# Patient Record
Sex: MALE | Race: WHITE | NOT HISPANIC OR LATINO
[De-identification: names, ages, dates, MRNs, and addresses within clinical notes are randomized per-mention and may not be internally consistent; named-entity substitution may affect disease eponyms.]

---

## 2017-06-21 PROBLEM — Z00.00 ENCOUNTER FOR PREVENTIVE HEALTH EXAMINATION: Status: ACTIVE | Noted: 2017-06-21

## 2017-08-18 ENCOUNTER — APPOINTMENT (OUTPATIENT)
Dept: ORTHOPEDIC SURGERY | Facility: CLINIC | Age: 57
End: 2017-08-18
Payer: COMMERCIAL

## 2017-08-18 VITALS
HEART RATE: 56 BPM | WEIGHT: 270 LBS | BODY MASS INDEX: 32.88 KG/M2 | HEIGHT: 76 IN | DIASTOLIC BLOOD PRESSURE: 80 MMHG | SYSTOLIC BLOOD PRESSURE: 139 MMHG

## 2017-08-18 DIAGNOSIS — Z82.61 FAMILY HISTORY OF ARTHRITIS: ICD-10-CM

## 2017-08-18 DIAGNOSIS — M48.06 SPINAL STENOSIS, LUMBAR REGION: ICD-10-CM

## 2017-08-18 DIAGNOSIS — M51.26 OTHER INTERVERTEBRAL DISC DISPLACEMENT, LUMBAR REGION: ICD-10-CM

## 2017-08-18 DIAGNOSIS — Z87.891 PERSONAL HISTORY OF NICOTINE DEPENDENCE: ICD-10-CM

## 2017-08-18 DIAGNOSIS — Z78.9 OTHER SPECIFIED HEALTH STATUS: ICD-10-CM

## 2017-08-18 DIAGNOSIS — Z80.9 FAMILY HISTORY OF MALIGNANT NEOPLASM, UNSPECIFIED: ICD-10-CM

## 2017-08-18 DIAGNOSIS — Z85.828 PERSONAL HISTORY OF OTHER MALIGNANT NEOPLASM OF SKIN: ICD-10-CM

## 2017-08-18 PROCEDURE — 20610 DRAIN/INJ JOINT/BURSA W/O US: CPT | Mod: 50

## 2017-08-18 PROCEDURE — 73562 X-RAY EXAM OF KNEE 3: CPT | Mod: 50

## 2017-08-18 PROCEDURE — 73502 X-RAY EXAM HIP UNI 2-3 VIEWS: CPT | Mod: LT

## 2017-08-18 PROCEDURE — 99204 OFFICE O/P NEW MOD 45 MIN: CPT | Mod: 25

## 2017-08-18 RX ORDER — BECLOMETHASONE DIPROPIONATE 80 UG/1
80 AEROSOL, METERED RESPIRATORY (INHALATION)
Refills: 0 | Status: ACTIVE | COMMUNITY

## 2017-12-01 ENCOUNTER — APPOINTMENT (OUTPATIENT)
Dept: ORTHOPEDIC SURGERY | Facility: CLINIC | Age: 57
End: 2017-12-01
Payer: COMMERCIAL

## 2017-12-01 VITALS
BODY MASS INDEX: 32.88 KG/M2 | HEIGHT: 76 IN | HEART RATE: 62 BPM | SYSTOLIC BLOOD PRESSURE: 134 MMHG | WEIGHT: 270 LBS | DIASTOLIC BLOOD PRESSURE: 78 MMHG

## 2017-12-01 PROCEDURE — 99214 OFFICE O/P EST MOD 30 MIN: CPT | Mod: 25

## 2017-12-01 PROCEDURE — 20610 DRAIN/INJ JOINT/BURSA W/O US: CPT | Mod: 50

## 2017-12-01 RX ORDER — DICLOFENAC SODIUM AND MISOPROSTOL 75; 200 MG/1; UG/1
75-0.2 TABLET, DELAYED RELEASE ORAL DAILY
Qty: 30 | Refills: 2 | Status: ACTIVE | COMMUNITY
Start: 2017-12-01 | End: 1900-01-01

## 2017-12-15 ENCOUNTER — OTHER (OUTPATIENT)
Age: 57
End: 2017-12-15

## 2018-01-05 ENCOUNTER — OTHER (OUTPATIENT)
Age: 58
End: 2018-01-05

## 2018-01-23 ENCOUNTER — APPOINTMENT (OUTPATIENT)
Dept: ORTHOPEDIC SURGERY | Facility: CLINIC | Age: 58
End: 2018-01-23
Payer: COMMERCIAL

## 2018-01-23 VITALS
BODY MASS INDEX: 46.1 KG/M2 | HEART RATE: 73 BPM | DIASTOLIC BLOOD PRESSURE: 78 MMHG | HEIGHT: 64 IN | SYSTOLIC BLOOD PRESSURE: 153 MMHG | WEIGHT: 270 LBS

## 2018-01-23 PROCEDURE — 20610 DRAIN/INJ JOINT/BURSA W/O US: CPT | Mod: 50

## 2018-01-30 ENCOUNTER — APPOINTMENT (OUTPATIENT)
Dept: ORTHOPEDIC SURGERY | Facility: CLINIC | Age: 58
End: 2018-01-30
Payer: COMMERCIAL

## 2018-01-30 PROCEDURE — 20610 DRAIN/INJ JOINT/BURSA W/O US: CPT | Mod: 50

## 2018-02-06 ENCOUNTER — APPOINTMENT (OUTPATIENT)
Dept: ORTHOPEDIC SURGERY | Facility: CLINIC | Age: 58
End: 2018-02-06
Payer: COMMERCIAL

## 2018-02-06 VITALS
WEIGHT: 270 LBS | DIASTOLIC BLOOD PRESSURE: 84 MMHG | HEART RATE: 83 BPM | HEIGHT: 76 IN | SYSTOLIC BLOOD PRESSURE: 145 MMHG | BODY MASS INDEX: 32.88 KG/M2

## 2018-02-06 PROCEDURE — 20610 DRAIN/INJ JOINT/BURSA W/O US: CPT | Mod: 50

## 2018-02-06 RX ORDER — HYALURONATE SODIUM 10 MG/ML
20 VIAL (ML) INTRAARTICULAR
Qty: 12 | Refills: 0 | Status: DISCONTINUED | OUTPATIENT
Start: 2017-12-01 | End: 2018-02-06

## 2018-02-13 ENCOUNTER — APPOINTMENT (OUTPATIENT)
Dept: ORTHOPEDIC SURGERY | Facility: CLINIC | Age: 58
End: 2018-02-13
Payer: COMMERCIAL

## 2018-02-13 VITALS
DIASTOLIC BLOOD PRESSURE: 91 MMHG | HEART RATE: 73 BPM | HEIGHT: 76 IN | BODY MASS INDEX: 32.88 KG/M2 | SYSTOLIC BLOOD PRESSURE: 142 MMHG | WEIGHT: 270 LBS

## 2018-02-13 PROCEDURE — 20610 DRAIN/INJ JOINT/BURSA W/O US: CPT | Mod: 50

## 2018-02-13 RX ORDER — TRAMADOL HYDROCHLORIDE 50 MG/1
50 TABLET, COATED ORAL
Qty: 40 | Refills: 0 | Status: ACTIVE | COMMUNITY
Start: 2018-02-13 | End: 1900-01-01

## 2018-02-20 ENCOUNTER — APPOINTMENT (OUTPATIENT)
Dept: ORTHOPEDIC SURGERY | Facility: CLINIC | Age: 58
End: 2018-02-20
Payer: COMMERCIAL

## 2018-02-20 VITALS
WEIGHT: 270 LBS | SYSTOLIC BLOOD PRESSURE: 166 MMHG | DIASTOLIC BLOOD PRESSURE: 92 MMHG | HEIGHT: 76 IN | HEART RATE: 83 BPM | BODY MASS INDEX: 32.88 KG/M2

## 2018-02-20 PROCEDURE — 20610 DRAIN/INJ JOINT/BURSA W/O US: CPT | Mod: 50

## 2018-10-19 ENCOUNTER — APPOINTMENT (OUTPATIENT)
Dept: ORTHOPEDIC SURGERY | Facility: CLINIC | Age: 58
End: 2018-10-19
Payer: COMMERCIAL

## 2018-10-19 VITALS
HEART RATE: 58 BPM | DIASTOLIC BLOOD PRESSURE: 86 MMHG | TEMPERATURE: 98.3 F | HEIGHT: 76 IN | BODY MASS INDEX: 32.88 KG/M2 | SYSTOLIC BLOOD PRESSURE: 146 MMHG | WEIGHT: 270 LBS

## 2018-10-19 PROCEDURE — 99214 OFFICE O/P EST MOD 30 MIN: CPT | Mod: 25

## 2018-10-19 PROCEDURE — 20610 DRAIN/INJ JOINT/BURSA W/O US: CPT | Mod: LT

## 2021-08-30 ENCOUNTER — APPOINTMENT (OUTPATIENT)
Dept: ORTHOPEDIC SURGERY | Facility: CLINIC | Age: 61
End: 2021-08-30
Payer: COMMERCIAL

## 2021-08-30 VITALS
HEIGHT: 75 IN | BODY MASS INDEX: 32.33 KG/M2 | TEMPERATURE: 98.1 F | DIASTOLIC BLOOD PRESSURE: 88 MMHG | WEIGHT: 260 LBS | HEART RATE: 55 BPM | SYSTOLIC BLOOD PRESSURE: 144 MMHG

## 2021-08-30 DIAGNOSIS — M25.552 PAIN IN LEFT HIP: ICD-10-CM

## 2021-08-30 DIAGNOSIS — M16.12 UNILATERAL PRIMARY OSTEOARTHRITIS, LEFT HIP: ICD-10-CM

## 2021-08-30 DIAGNOSIS — M17.0 BILATERAL PRIMARY OSTEOARTHRITIS OF KNEE: ICD-10-CM

## 2021-08-30 PROCEDURE — 73502 X-RAY EXAM HIP UNI 2-3 VIEWS: CPT

## 2021-08-30 PROCEDURE — 99214 OFFICE O/P EST MOD 30 MIN: CPT

## 2021-08-30 RX ORDER — METFORMIN HYDROCHLORIDE 500 MG/1
500 TABLET, COATED ORAL
Qty: 180 | Refills: 0 | Status: ACTIVE | COMMUNITY
Start: 2021-04-20

## 2021-08-30 RX ORDER — AMOXICILLIN 500 MG/1
500 TABLET, FILM COATED ORAL
Qty: 30 | Refills: 0 | Status: ACTIVE | COMMUNITY
Start: 2021-04-06

## 2021-08-30 RX ORDER — IBUPROFEN 800 MG/1
800 TABLET, FILM COATED ORAL
Qty: 18 | Refills: 0 | Status: ACTIVE | COMMUNITY
Start: 2021-04-06

## 2021-08-30 RX ORDER — BECLOMETHASONE DIPROPIONATE HFA 80 UG/1
80 AEROSOL, METERED RESPIRATORY (INHALATION)
Qty: 11 | Refills: 0 | Status: ACTIVE | COMMUNITY
Start: 2021-07-05

## 2021-08-30 NOTE — HISTORY OF PRESENT ILLNESS
[Pain Location] : pain [Worsening] : worsening [___ yrs] : [unfilled] year(s) ago [7] : a current pain level of 7/10 [3] : a minimum pain level of 3/10 [8] : a maximum pain level of 8/10 [Hip Movement] : worsened by hip movement [Walking] : worsened by walking [Rest] : relieved by rest [de-identified] : 61 y/o M presents with b/l hip pain, left worse than right. He notes groin pain with walking. He can't exercise, including biking, because of the pain. He is taking Tramadol for 2 years for the hip pain. He underwent a CT of the pelvis. He is interested in left DELVIN. He also has a known hx of bone on bone osteoarthritis in the right knee.

## 2021-08-30 NOTE — PHYSICAL EXAM
[Antalgic] : antalgic [LE] : Sensory: Intact in bilateral lower extremities [ALL] : dorsalis pedis, posterior tibial, femoral, popliteal, and radial 2+ and symmetric bilaterally [Normal] : Alert and in no acute distress [Poor Appearance] : well-appearing [de-identified] : GENERAL APPEARANCE: Well nourished and hydrated, pleasant, alert, and oriented x 3. Appears their stated age. \par HEENT: Normocephalic, extraocular eye motion intact. Nasal septum midline. Oral cavity clear. External auditory canal clear. \par RESPIRATORY: Breath sounds clear and audible in all lobes. No wheezing, No accessory muscle use.\par CARDIOVASCULAR: No apparent abnormalities. No lower leg edema. No varicosities. Pedal pulses are palpable.\par NEUROLOGIC: Sensation is normal, no muscle weakness in the upper or lower extremities.\par DERMATOLOGIC: No apparent skin lesions, moist, warm, no rash.\par SPINE: Cervical spine appears normal and moves freely; thoracic spine appears normal and moves freely; lumbosacral spine appears normal and moves freely, normal, nontender.\par MUSCULOSKELETAL: Hands, wrists, and elbows are normal and move freely, shoulders are normal and move freely.  [de-identified] : left hip exam showed pain with SLR in the groin\par moderate reduction of hip ROM, pain with ROM \par mild antalgic gait. [de-identified] : 3V xray of the left hip done in the office today and reviewed by Dr. Pipe Gomez demonstrates bone on bone osteoarthritis \par \par CT scan of the pelvis performed at [facility] on 5/4/2021 showed the following:\par 1. No evidence of sacroiliitis.\par 2. Osteoarthritis severe in the left hip, mild at the right hip, mild-to-moderate at the pubic symphysis.\par 3. Trace left hip joint effusion.\par 4. Small focal hernia containing fat.\par 5. Incompletely descended right testis.\par 6. Mild enlargement of the prostate.\par 7. Lumbar spondylosis, postoperative changes as noted. Severe bilateral femoral stenosis at L4-L5 and L5-S1.

## 2021-08-30 NOTE — END OF VISIT
[FreeTextEntry3] : I, Rey Jay, acted solely as a scribe for Dr. Pipe Gomez on this date 08/30/2021.

## 2021-08-30 NOTE — DISCUSSION/SUMMARY
[Medication Risks Reviewed] : Medication risks reviewed [Surgical risks reviewed] : Surgical risks reviewed [de-identified] : 59 y/o M with bone on bone left hip osteoarthritis. Conservative therapy and surgical options discussed in detail with the patient. The patient is a candidate for a left DELVIN. He is experiencing worsening pain which is inhibiting his ADLs including but not limited to walking, standing, biking, and sitting. He is interested in pursuing surgery. We discussed pre-op, surgery, and post-op in detail. The patient is aware and comprehends the risk of infection, dislocation, and falling post-op. He scheduled the left DELVIN today. All of his questions were answered. \par \par The patient is a 60 year individual with end stage arthritis of their left hip joint. Based upon the patient's continued symptoms and failure to respond to conservative treatment I have recommended a left total hip arthroplasty for this patient. A long discussion took place with the patient describing what a total joint replacement is and what the procedure would entail. A total hip arthroplasty model, similar to the implant that will be used during the operation, was utilized to demonstrate and to discuss the various bearing surfaces of the implants. The hospitalization and post-operative care and rehabilitation were also discussed. The use of perioperative antibiotics and DVT prophylaxis were discussed. The risk, benefits and alternatives to a surgical intervention were discussed at length with the patient. The patient was also advised of risks related to the medical comorbidities, elevated body mass index (BMI), and smoking where applicable. We discussed how to reduce modifiable risk factors and encouraged smoking cessation were applicable.. A lengthy discussion took place to review the most common complications including but not limited to: deep vein thrombosis, pulmonary embolus, heart attack, stroke, infection, wound breakdown, numbness, damage to nerves, tendon, muscles, arteries or other blood vessels, death and other possible complications from anesthesia. The patient was told that we will take steps to minimize these risks by using sterile technique, antibiotics and DVT prophylaxis when appropriate and follow the patient postoperatively in the office setting to monitor progress. The possibility of recurrent pain, no improvement in pain and actual worsening of pain were also discussed with the patient.\par The discharge plan of care focused on the patient going home following surgery. The patient was encouraged to make the necessary arrangements to have someone stay with them when they are discharged home. Following discharge, a home care nurse will visit the patient. The home care nurse will open your home care case and request home physical therapy services. Home physical therapy will commence following discharge provided it is appropriate and covered by the health insurance benefit plan. \par The benefits of surgery were discussed with the patient including the potential for improving his current clinical condition through operative intervention. Alternatives to surgical intervention including continued conservative management were also discussed in detail. All questions were answered to the satisfaction of the patient. The treatment plan of care, as well as a model of a total hip arthroplasty equivalent to the one that will be used for their total joint replacement, was shared with the patient. The patient agreed to the plan of care as well as the use of implants in their total joint replacement.

## 2021-12-22 ENCOUNTER — OUTPATIENT (OUTPATIENT)
Dept: OUTPATIENT SERVICES | Facility: HOSPITAL | Age: 61
LOS: 1 days | End: 2021-12-22
Payer: COMMERCIAL

## 2021-12-22 VITALS
HEIGHT: 75 IN | OXYGEN SATURATION: 98 % | WEIGHT: 268.52 LBS | RESPIRATION RATE: 19 BRPM | HEART RATE: 58 BPM | DIASTOLIC BLOOD PRESSURE: 70 MMHG | TEMPERATURE: 98 F | SYSTOLIC BLOOD PRESSURE: 120 MMHG

## 2021-12-22 DIAGNOSIS — Z98.890 OTHER SPECIFIED POSTPROCEDURAL STATES: Chronic | ICD-10-CM

## 2021-12-22 DIAGNOSIS — Z29.9 ENCOUNTER FOR PROPHYLACTIC MEASURES, UNSPECIFIED: ICD-10-CM

## 2021-12-22 DIAGNOSIS — M16.12 UNILATERAL PRIMARY OSTEOARTHRITIS, LEFT HIP: ICD-10-CM

## 2021-12-22 DIAGNOSIS — Z01.818 ENCOUNTER FOR OTHER PREPROCEDURAL EXAMINATION: ICD-10-CM

## 2021-12-22 DIAGNOSIS — Z13.89 ENCOUNTER FOR SCREENING FOR OTHER DISORDER: ICD-10-CM

## 2021-12-22 LAB
A1C WITH ESTIMATED AVERAGE GLUCOSE RESULT: 5.8 % — HIGH (ref 4–5.6)
ANION GAP SERPL CALC-SCNC: 12 MMOL/L — SIGNIFICANT CHANGE UP (ref 5–17)
APTT BLD: 33.1 SEC — SIGNIFICANT CHANGE UP (ref 27.5–35.5)
BASOPHILS # BLD AUTO: 0.04 K/UL — SIGNIFICANT CHANGE UP (ref 0–0.2)
BASOPHILS NFR BLD AUTO: 0.8 % — SIGNIFICANT CHANGE UP (ref 0–2)
BLD GP AB SCN SERPL QL: SIGNIFICANT CHANGE UP
BUN SERPL-MCNC: 13.4 MG/DL — SIGNIFICANT CHANGE UP (ref 8–20)
CALCIUM SERPL-MCNC: 9 MG/DL — SIGNIFICANT CHANGE UP (ref 8.6–10.2)
CHLORIDE SERPL-SCNC: 103 MMOL/L — SIGNIFICANT CHANGE UP (ref 98–107)
CO2 SERPL-SCNC: 25 MMOL/L — SIGNIFICANT CHANGE UP (ref 22–29)
CREAT SERPL-MCNC: 0.84 MG/DL — SIGNIFICANT CHANGE UP (ref 0.5–1.3)
EOSINOPHIL # BLD AUTO: 0.11 K/UL — SIGNIFICANT CHANGE UP (ref 0–0.5)
EOSINOPHIL NFR BLD AUTO: 2.1 % — SIGNIFICANT CHANGE UP (ref 0–6)
ESTIMATED AVERAGE GLUCOSE: 120 MG/DL — HIGH (ref 68–114)
GLUCOSE SERPL-MCNC: 117 MG/DL — HIGH (ref 70–99)
HCT VFR BLD CALC: 43.2 % — SIGNIFICANT CHANGE UP (ref 39–50)
HGB BLD-MCNC: 14.5 G/DL — SIGNIFICANT CHANGE UP (ref 13–17)
IMM GRANULOCYTES NFR BLD AUTO: 0.2 % — SIGNIFICANT CHANGE UP (ref 0–1.5)
INR BLD: 1.05 RATIO — SIGNIFICANT CHANGE UP (ref 0.88–1.16)
LYMPHOCYTES # BLD AUTO: 0.66 K/UL — LOW (ref 1–3.3)
LYMPHOCYTES # BLD AUTO: 12.5 % — LOW (ref 13–44)
MCHC RBC-ENTMCNC: 32.2 PG — SIGNIFICANT CHANGE UP (ref 27–34)
MCHC RBC-ENTMCNC: 33.6 GM/DL — SIGNIFICANT CHANGE UP (ref 32–36)
MCV RBC AUTO: 96 FL — SIGNIFICANT CHANGE UP (ref 80–100)
MONOCYTES # BLD AUTO: 0.56 K/UL — SIGNIFICANT CHANGE UP (ref 0–0.9)
MONOCYTES NFR BLD AUTO: 10.6 % — SIGNIFICANT CHANGE UP (ref 2–14)
NEUTROPHILS # BLD AUTO: 3.91 K/UL — SIGNIFICANT CHANGE UP (ref 1.8–7.4)
NEUTROPHILS NFR BLD AUTO: 73.8 % — SIGNIFICANT CHANGE UP (ref 43–77)
PLATELET # BLD AUTO: 234 K/UL — SIGNIFICANT CHANGE UP (ref 150–400)
POTASSIUM SERPL-MCNC: 4.7 MMOL/L — SIGNIFICANT CHANGE UP (ref 3.5–5.3)
POTASSIUM SERPL-SCNC: 4.7 MMOL/L — SIGNIFICANT CHANGE UP (ref 3.5–5.3)
PROTHROM AB SERPL-ACNC: 12.2 SEC — SIGNIFICANT CHANGE UP (ref 10.6–13.6)
RBC # BLD: 4.5 M/UL — SIGNIFICANT CHANGE UP (ref 4.2–5.8)
RBC # FLD: 11.9 % — SIGNIFICANT CHANGE UP (ref 10.3–14.5)
SODIUM SERPL-SCNC: 139 MMOL/L — SIGNIFICANT CHANGE UP (ref 135–145)
WBC # BLD: 5.29 K/UL — SIGNIFICANT CHANGE UP (ref 3.8–10.5)
WBC # FLD AUTO: 5.29 K/UL — SIGNIFICANT CHANGE UP (ref 3.8–10.5)

## 2021-12-22 PROCEDURE — 93005 ELECTROCARDIOGRAM TRACING: CPT

## 2021-12-22 PROCEDURE — 93010 ELECTROCARDIOGRAM REPORT: CPT

## 2021-12-22 PROCEDURE — G0463: CPT

## 2021-12-22 RX ORDER — SODIUM CHLORIDE 9 MG/ML
3 INJECTION INTRAMUSCULAR; INTRAVENOUS; SUBCUTANEOUS EVERY 8 HOURS
Refills: 0 | Status: DISCONTINUED | OUTPATIENT
Start: 2022-01-11 | End: 2022-01-11

## 2021-12-22 NOTE — PATIENT PROFILE ADULT - NSPROHMSYMPCOND_GEN_A_NUR
pre-op instructions reviewed NP to complete. MRSA/MSSA swabbed. Joint class info & book given , Covid testing required 72 hours prior to procedure

## 2021-12-22 NOTE — H&P PST ADULT - MUSCULOSKELETAL
negative Left hip/no joint swelling/no joint erythema/no joint warmth/decreased ROM/decreased ROM due to pain detailed exam

## 2021-12-22 NOTE — H&P PST ADULT - ASSESSMENT
60 y/o male scheduled for left hip total joint replacement with Dr. Jason Betancur 2022.   Medical clearance  Cardiac clearance    OPIOID RISK TOOL    BERNY EACH BOX THAT APPLIES AND ADD TOTALS AT THE END    FAMILY HISTORY OF SUBSTANCE ABUSE                 FEMALE         MALE                                                Alcohol                             [  ]1 pt          [  ]3pts                                               Illegal Durgs                     [  ]2 pts        [  ]3pts                                               Rx Drugs                           [  ]4 pts        [  ]4 pts    PERSONAL HISTORY OF SUBSTANCE ABUSE                                                                                          Alcohol                             [  ]3 pts       [  ]3 pts                                               Illegal Drugs                     [  ]4 pts        [  ]4 pts                                               Rx Drugs                           [  ]5 pts        [  ]5 pts    AGE BETWEEN 16-45 YEARS                                      [  ]1 pt         [  ]1 pt    HISTORY OF PREADOLESCENT   SEXUAL ABUSE                                                             [  ]3 pts        [  ]0pts    PSYCHOLOGICAL DISEASE                     ADD, OCD, Bipolar, Schizophrenia        [  ]2 pts         [  ]2 pts                      Depression                                               [  ]1 pt           [  ]1 pt           SCORING TOTAL   (add numbers and type here)              (0)                                     A score of 3 or lower indicated LOW risk for future opioid abuse  A score of 4 to 7 indicated moderate risk for future opioid abuse  A score of 8 or higher indicates a high risk for opioid abuse    CAPRINI SCORE [CLOT]    AGE RELATED RISK FACTORS                                                       MOBILITY RELATED FACTORS  [ ] Age 41-60 years                                            (1 Point)                  [ ] Bed rest                                                        (1 Point)  [x ] Age: 61-74 years                                           (2 Points)                 [ ] Plaster cast                                                   (2 Points)  [ ] Age= 75 years                                              (3 Points)                 [ ] Bed bound for more than 72 hours                 (2 Points)    DISEASE RELATED RISK FACTORS                                               GENDER SPECIFIC FACTORS  [ ] Edema in the lower extremities                       (1 Point)                  [ ] Pregnancy                                                     (1 Point)  [ ] Varicose veins                                               (1 Point)                  [ ] Post-partum < 6 weeks                                   (1 Point)             [ x] BMI > 25 Kg/m2                                            (1 Point)                  [ ] Hormonal therapy  or oral contraception          (1 Point)                 [ ] Sepsis (in the previous month)                        (1 Point)                  [ ] History of pregnancy complications                 (1 point)  [ ] Pneumonia or serious lung disease                                               [ ] Unexplained or recurrent                     (1 Point)           (in the previous month)                               (1 Point)  [ ] Abnormal pulmonary function test                     (1 Point)                 SURGERY RELATED RISK FACTORS  [ ] Acute myocardial infarction                              (1 Point)                 [ ]  Section                                             (1 Point)  [ ] Congestive heart failure (in the previous month)  (1 Point)               [ ] Minor surgery                                                  (1 Point)   [ ] Inflammatory bowel disease                             (1 Point)                 [ ] Arthroscopic surgery                                        (2 Points)  [ ] Central venous access                                      (2 Points)                [ x] General surgery lasting more than 45 minutes   (2 Points)       [ ] Stroke (in the previous month)                          (5 Points)               [ ] Elective arthroplasty                                         (5 Points)                                                                                                                                               HEMATOLOGY RELATED FACTORS                                                 TRAUMA RELATED RISK FACTORS  [ ] Prior episodes of VTE                                     (3 Points)                [ ] Fracture of the hip, pelvis, or leg                       (5 Points)  [ ] Positive family history for VTE                         (3 Points)                 [ ] Acute spinal cord injury (in the previous month)  (5 Points)  [ ] Prothrombin 57180 A                                     (3 Points)                 [ ] Paralysis  (less than 1 month)                             (5 Points)  [ ] Factor V Leiden                                             (3 Points)                  [ ] Multiple Trauma within 1 month                        (5 Points)  [ ] Lupus anticoagulants                                     (3 Points)                                                           [ ] Anticardiolipin antibodies                               (3 Points)                                                       [ ] High homocysteine in the blood                      (3 Points)                                             [ ] Other congenital or acquired thrombophilia      (3 Points)                                                [ ] Heparin induced thrombocytopenia                  (3 Points)                                          Total Score [     5     ]    Caprini Score 0 - 2:  Low Risk, No VTE Prophylaxis required for most patients, encourage ambulation  Caprini Score 3 - 6:  At Risk, pharmacologic VTE prophylaxis is indicated for most patients (in the absence of a contraindication)  Caprini Score Greater than or = 7:  High Risk, pharmacologic VTE prophylaxis is indicated for most patients (in the absence of a contraindication)

## 2021-12-22 NOTE — H&P PST ADULT - HISTORY OF PRESENT ILLNESS
62 y/o male with PMH presents to PST with c/o of left hip pain. Pain for about 4 years- pain is becoming worse - radiate to left groin. Pain is aggreavtae dby ambulation. Better with sitting down. Tramadol for pain with somed effect. Scheduled for  60 y/o male with PMH of asthma and basal cell carcinoma presents to PST with c/o of left hip pain. Reports hx of left hip pain for about 4 years but is becoming progressively worse. Pain radiates to left groin. Aggravated by ambulation and alleviated by sitting. Takes tramadol for pain with some effect. Scheduled for left hip total joint replacement with Dr. Jason Betancur 1/11/2022.

## 2021-12-22 NOTE — H&P PST ADULT - NSICDXPASTSURGICALHX_GEN_ALL_CORE_FT
PAST SURGICAL HISTORY:  H/O arthroscopy of knee Bilateral- 1976,2002,2012    History of back surgery 2014

## 2021-12-22 NOTE — PATIENT PROFILE ADULT - FALL HARM RISK - UNIVERSAL INTERVENTIONS
Bed in lowest position, wheels locked, appropriate side rails in place/Call bell, personal items and telephone in reach/Instruct patient to call for assistance before getting out of bed or chair/Non-slip footwear when patient is out of bed/[place] to call system/Physically safe environment - no spills, clutter or unnecessary equipment/Purposeful Proactive Rounding/Room/bathroom lighting operational, light cord in reach

## 2021-12-22 NOTE — H&P PST ADULT - PROBLEM SELECTOR PLAN 3
cheduled for left hip total joint replacement with Dr. Jason Betancur 1/11/2022.   Medical clearance  Cardiac clearance

## 2021-12-22 NOTE — H&P PST ADULT - PROBLEM SELECTOR PLAN 2
Caprini score 5- Moderate score    Primary team assess/recommend pharmacological/mechanical measures for VTE prophylaxis

## 2021-12-23 LAB
MRSA PCR RESULT.: SIGNIFICANT CHANGE UP
S AUREUS DNA NOSE QL NAA+PROBE: SIGNIFICANT CHANGE UP

## 2022-01-07 RX ORDER — CEFAZOLIN SODIUM 1 G
3000 VIAL (EA) INJECTION ONCE
Refills: 0 | Status: DISCONTINUED | OUTPATIENT
Start: 2022-01-11 | End: 2022-01-11

## 2022-01-07 RX ORDER — TRANEXAMIC ACID 100 MG/ML
1000 INJECTION, SOLUTION INTRAVENOUS ONCE
Refills: 0 | Status: DISCONTINUED | OUTPATIENT
Start: 2022-01-11 | End: 2022-01-11

## 2022-01-10 ENCOUNTER — FORM ENCOUNTER (OUTPATIENT)
Age: 62
End: 2022-01-10

## 2022-01-10 ENCOUNTER — TRANSCRIPTION ENCOUNTER (OUTPATIENT)
Age: 62
End: 2022-01-10

## 2022-01-11 ENCOUNTER — APPOINTMENT (OUTPATIENT)
Dept: ORTHOPEDIC SURGERY | Facility: HOSPITAL | Age: 62
End: 2022-01-11

## 2022-01-11 ENCOUNTER — INPATIENT (INPATIENT)
Facility: HOSPITAL | Age: 62
LOS: 0 days | Discharge: HOME | DRG: 470 | End: 2022-01-12
Attending: ORTHOPAEDIC SURGERY | Admitting: ORTHOPAEDIC SURGERY
Payer: COMMERCIAL

## 2022-01-11 ENCOUNTER — TRANSCRIPTION ENCOUNTER (OUTPATIENT)
Age: 62
End: 2022-01-11

## 2022-01-11 VITALS
TEMPERATURE: 98 F | DIASTOLIC BLOOD PRESSURE: 81 MMHG | WEIGHT: 259.93 LBS | HEIGHT: 75 IN | RESPIRATION RATE: 16 BRPM | SYSTOLIC BLOOD PRESSURE: 156 MMHG | OXYGEN SATURATION: 100 % | HEART RATE: 58 BPM

## 2022-01-11 DIAGNOSIS — Z98.890 OTHER SPECIFIED POSTPROCEDURAL STATES: Chronic | ICD-10-CM

## 2022-01-11 DIAGNOSIS — M16.12 UNILATERAL PRIMARY OSTEOARTHRITIS, LEFT HIP: ICD-10-CM

## 2022-01-11 LAB
BLD GP AB SCN SERPL QL: SIGNIFICANT CHANGE UP
GLUCOSE BLDC GLUCOMTR-MCNC: 112 MG/DL — HIGH (ref 70–99)
GLUCOSE BLDC GLUCOMTR-MCNC: 113 MG/DL — HIGH (ref 70–99)

## 2022-01-11 PROCEDURE — 27130 TOTAL HIP ARTHROPLASTY: CPT | Mod: LT

## 2022-01-11 PROCEDURE — 27130 TOTAL HIP ARTHROPLASTY: CPT | Mod: AS,LT

## 2022-01-11 PROCEDURE — 99222 1ST HOSP IP/OBS MODERATE 55: CPT

## 2022-01-11 PROCEDURE — 73501 X-RAY EXAM HIP UNI 1 VIEW: CPT | Mod: 26,LT

## 2022-01-11 DEVICE — IMPLANTABLE DEVICE: Type: IMPLANTABLE DEVICE | Status: FUNCTIONAL

## 2022-01-11 DEVICE — SCREW ACET SELF TAP 6.5X35MM: Type: IMPLANTABLE DEVICE | Status: FUNCTIONAL

## 2022-01-11 DEVICE — HEAD FEM 40MM -3.5MMBIOLOX DELTA CERAMIC: Type: IMPLANTABLE DEVICE | Status: FUNCTIONAL

## 2022-01-11 RX ORDER — MAGNESIUM HYDROXIDE 400 MG/1
30 TABLET, CHEWABLE ORAL DAILY
Refills: 0 | Status: DISCONTINUED | OUTPATIENT
Start: 2022-01-11 | End: 2022-01-12

## 2022-01-11 RX ORDER — SENNA PLUS 8.6 MG/1
2 TABLET ORAL AT BEDTIME
Refills: 0 | Status: DISCONTINUED | OUTPATIENT
Start: 2022-01-11 | End: 2022-01-12

## 2022-01-11 RX ORDER — ASPIRIN/CALCIUM CARB/MAGNESIUM 324 MG
325 TABLET ORAL
Refills: 0 | Status: DISCONTINUED | OUTPATIENT
Start: 2022-01-11 | End: 2022-01-12

## 2022-01-11 RX ORDER — SODIUM CHLORIDE 9 MG/ML
1000 INJECTION, SOLUTION INTRAVENOUS
Refills: 0 | Status: DISCONTINUED | OUTPATIENT
Start: 2022-01-11 | End: 2022-01-11

## 2022-01-11 RX ORDER — KETOROLAC TROMETHAMINE 30 MG/ML
15 SYRINGE (ML) INJECTION EVERY 6 HOURS
Refills: 0 | Status: DISCONTINUED | OUTPATIENT
Start: 2022-01-11 | End: 2022-01-12

## 2022-01-11 RX ORDER — HYDROMORPHONE HYDROCHLORIDE 2 MG/ML
4 INJECTION INTRAMUSCULAR; INTRAVENOUS; SUBCUTANEOUS
Refills: 0 | Status: DISCONTINUED | OUTPATIENT
Start: 2022-01-11 | End: 2022-01-12

## 2022-01-11 RX ORDER — ACETAMINOPHEN 500 MG
1000 TABLET ORAL
Refills: 0 | Status: COMPLETED | OUTPATIENT
Start: 2022-01-11 | End: 2022-01-11

## 2022-01-11 RX ORDER — ACETAMINOPHEN 500 MG
975 TABLET ORAL ONCE
Refills: 0 | Status: COMPLETED | OUTPATIENT
Start: 2022-01-11 | End: 2022-01-11

## 2022-01-11 RX ORDER — CELECOXIB 200 MG/1
400 CAPSULE ORAL ONCE
Refills: 0 | Status: COMPLETED | OUTPATIENT
Start: 2022-01-11 | End: 2022-01-11

## 2022-01-11 RX ORDER — CEFAZOLIN SODIUM 1 G
2000 VIAL (EA) INJECTION
Refills: 0 | Status: COMPLETED | OUTPATIENT
Start: 2022-01-11 | End: 2022-01-12

## 2022-01-11 RX ORDER — OXYCODONE HYDROCHLORIDE 5 MG/1
5 TABLET ORAL
Refills: 0 | Status: DISCONTINUED | OUTPATIENT
Start: 2022-01-11 | End: 2022-01-12

## 2022-01-11 RX ORDER — SODIUM CHLORIDE 9 MG/ML
1000 INJECTION INTRAMUSCULAR; INTRAVENOUS; SUBCUTANEOUS
Refills: 0 | Status: DISCONTINUED | OUTPATIENT
Start: 2022-01-11 | End: 2022-01-12

## 2022-01-11 RX ORDER — APREPITANT 80 MG/1
40 CAPSULE ORAL ONCE
Refills: 0 | Status: COMPLETED | OUTPATIENT
Start: 2022-01-11 | End: 2022-01-11

## 2022-01-11 RX ORDER — ONDANSETRON 8 MG/1
4 TABLET, FILM COATED ORAL EVERY 6 HOURS
Refills: 0 | Status: DISCONTINUED | OUTPATIENT
Start: 2022-01-11 | End: 2022-01-12

## 2022-01-11 RX ORDER — HYDROMORPHONE HYDROCHLORIDE 2 MG/ML
0.5 INJECTION INTRAMUSCULAR; INTRAVENOUS; SUBCUTANEOUS EVERY 4 HOURS
Refills: 0 | Status: DISCONTINUED | OUTPATIENT
Start: 2022-01-11 | End: 2022-01-12

## 2022-01-11 RX ORDER — ALBUTEROL 90 UG/1
2 AEROSOL, METERED ORAL EVERY 6 HOURS
Refills: 0 | Status: DISCONTINUED | OUTPATIENT
Start: 2022-01-11 | End: 2022-01-12

## 2022-01-11 RX ORDER — POLYETHYLENE GLYCOL 3350 17 G/17G
17 POWDER, FOR SOLUTION ORAL AT BEDTIME
Refills: 0 | Status: DISCONTINUED | OUTPATIENT
Start: 2022-01-11 | End: 2022-01-12

## 2022-01-11 RX ORDER — CELECOXIB 200 MG/1
200 CAPSULE ORAL EVERY 12 HOURS
Refills: 0 | Status: DISCONTINUED | OUTPATIENT
Start: 2022-01-12 | End: 2022-01-12

## 2022-01-11 RX ORDER — SODIUM CHLORIDE 9 MG/ML
500 INJECTION INTRAMUSCULAR; INTRAVENOUS; SUBCUTANEOUS ONCE
Refills: 0 | Status: COMPLETED | OUTPATIENT
Start: 2022-01-11 | End: 2022-01-11

## 2022-01-11 RX ORDER — ACETAMINOPHEN 500 MG
975 TABLET ORAL EVERY 8 HOURS
Refills: 0 | Status: DISCONTINUED | OUTPATIENT
Start: 2022-01-12 | End: 2022-01-12

## 2022-01-11 RX ORDER — OXYCODONE HYDROCHLORIDE 5 MG/1
10 TABLET ORAL
Refills: 0 | Status: DISCONTINUED | OUTPATIENT
Start: 2022-01-11 | End: 2022-01-12

## 2022-01-11 RX ORDER — FENTANYL CITRATE 50 UG/ML
25 INJECTION INTRAVENOUS
Refills: 0 | Status: DISCONTINUED | OUTPATIENT
Start: 2022-01-11 | End: 2022-01-11

## 2022-01-11 RX ORDER — PANTOPRAZOLE SODIUM 20 MG/1
40 TABLET, DELAYED RELEASE ORAL
Refills: 0 | Status: DISCONTINUED | OUTPATIENT
Start: 2022-01-11 | End: 2022-01-12

## 2022-01-11 RX ADMIN — SODIUM CHLORIDE 500 MILLILITER(S): 9 INJECTION INTRAMUSCULAR; INTRAVENOUS; SUBCUTANEOUS at 17:07

## 2022-01-11 RX ADMIN — Medication 975 MILLIGRAM(S): at 11:28

## 2022-01-11 RX ADMIN — CELECOXIB 400 MILLIGRAM(S): 200 CAPSULE ORAL at 11:28

## 2022-01-11 RX ADMIN — SODIUM CHLORIDE 75 MILLILITER(S): 9 INJECTION, SOLUTION INTRAVENOUS at 17:07

## 2022-01-11 RX ADMIN — Medication 100 MILLIGRAM(S): at 21:45

## 2022-01-11 RX ADMIN — APREPITANT 40 MILLIGRAM(S): 80 CAPSULE ORAL at 11:28

## 2022-01-11 RX ADMIN — Medication 400 MILLIGRAM(S): at 18:47

## 2022-01-11 RX ADMIN — SENNA PLUS 2 TABLET(S): 8.6 TABLET ORAL at 21:44

## 2022-01-11 RX ADMIN — HYDROMORPHONE HYDROCHLORIDE 4 MILLIGRAM(S): 2 INJECTION INTRAMUSCULAR; INTRAVENOUS; SUBCUTANEOUS at 22:00

## 2022-01-11 RX ADMIN — HYDROMORPHONE HYDROCHLORIDE 4 MILLIGRAM(S): 2 INJECTION INTRAMUSCULAR; INTRAVENOUS; SUBCUTANEOUS at 21:45

## 2022-01-11 NOTE — DISCHARGE NOTE PROVIDER - NSDCFUADDINST_GEN_ALL_CORE_FT
The patient will be seen in the office between 2-3 weeks for wound check.   **Your first post-operative visit has been scheduled prior to your admission. PLEASE CONTACT OFFICE TO CONFIRM THE APPOINTMENT DATE.   **  The silver based dressing is to be removed 7 days from the date of surgery.   ** CONTACT THE OFFICE IF THE FOLLOWING DEVELOP:  - the dressing becomes soiled or saturated  - you develop a fever greater that 101F  - the wound becomes red or you develop blistering around the wound  * Patient may shower after post-op day #3.   * The patient will continue home PT consistent with posterior total hip replacement protocol and will continue to practice posterior total hip precautions for a minimum of 6 week. Transition to outpatient PT will occur at the time of the first office visit.   * The patient is FULL weight bearing.  * The patient will continue ASPIRIN for 6 weeks after surgery for blood clot prevention.  *** While on aspirin, the patient will take daily omeprazole or other similar medication to protect the stomach from irritation.   * The patient will take OXYCODONE AND TYLENOL for pain control and adjust according to prescription and patient needs. Contact the office if pain increases while taking prescribed pain medications or related concerns develop.  * Celebrex will be taken twice daily for 3 weeks for pain control and prevention of excessive bone growth. Additional prescription may be requested at your office follow-up visit.  * The patient will take Senna S while taking oxycodone to prevent narcotic associated constipation.  Additionally, increase water intake (drink at least 8 glasses of water daily) and try adding fiber to the diet by eating fruits, vegetables and foods that are rich in grains. If constipation is experienced, contact the medical/primary care provider to discuss further treatment options.  * To avoid injury at home:  - continue use of rolling walker until cleared by physical therapist  - have family or friend remove all throw rug or objects in hallways that may present a trip hazard.  - if you experience any dizziness or medical concerns, call your medical doctor or  911.  * The implant may activate metal detection devices.  The patient will be seen in the office between 2-3 weeks for wound check.   **Your first post-operative visit has been scheduled prior to your admission. PLEASE CONTACT OFFICE TO CONFIRM THE APPOINTMENT DATE.   **  The silver based dressing is to be removed 7 days from the date of surgery (1/18).   ** CONTACT THE OFFICE IF THE FOLLOWING DEVELOP:  - the dressing becomes soiled or saturated  - you develop a fever greater that 101F  - the wound becomes red or you develop blistering around the wound  * Patient may shower after post-op day #3.   * The patient will continue home PT consistent with posterior total hip replacement protocol and will continue to practice posterior total hip precautions for a minimum of 6 week. Transition to outpatient PT will occur at the time of the first office visit.   * The patient is FULL weight bearing.  * The patient will continue ASPIRIN for 6 weeks after surgery for blood clot prevention.  *** While on aspirin, the patient will take daily omeprazole or other similar medication to protect the stomach from irritation.   * The patient will take OXYCODONE AND TYLENOL for pain control and adjust according to prescription and patient needs. Contact the office if pain increases while taking prescribed pain medications or related concerns develop.  * Celebrex will be taken twice daily for 3 weeks for pain control and prevention of excessive bone growth. Additional prescription may be requested at your office follow-up visit.  * The patient will take Senna S while taking oxycodone to prevent narcotic associated constipation.  Additionally, increase water intake (drink at least 8 glasses of water daily) and try adding fiber to the diet by eating fruits, vegetables and foods that are rich in grains. If constipation is experienced, contact the medical/primary care provider to discuss further treatment options.  * To avoid injury at home:  - continue use of rolling walker until cleared by physical therapist  - have family or friend remove all throw rug or objects in hallways that may present a trip hazard.  - if you experience any dizziness or medical concerns, call your medical doctor or  911.  * The implant may activate metal detection devices.

## 2022-01-11 NOTE — DISCHARGE NOTE PROVIDER - NSDCMRMEDTOKEN_GEN_ALL_CORE_FT
albuterol 0.63 mg/3 mL (0.021%) inhalation solution:   Qvar 40 mcg/inh inhalation aerosol: 1 puff(s) inhaled 2 times a day  traMADol 50 mg oral tablet:    acetaminophen 325 mg oral tablet: 3 tab(s) orally every 8 hours  albuterol 90 mcg/inh inhalation aerosol: 2 puff(s) inhaled every 6 hours, As needed, Shortness of Breath and/or Wheezing  Aspirin Enteric Coated 325 mg oral delayed release tablet: 1 tab(s) orally 2 times a day MDD:2  CeleBREX 200 mg oral capsule: 1 cap(s) orally 2 times a day x 21 days MDD:2  omeprazole 20 mg oral delayed release capsule: 1 cap(s) orally once a day before breakfast. MDD:1  oxyCODONE 5 mg oral tablet: 1 tab(s) orally every 6 hours, As Needed  for moderate to severe pain. MDD:4  Qvar 40 mcg/inh inhalation aerosol: 1 puff(s) inhaled 2 times a day  Senna S 50 mg-8.6 mg oral tablet: 2 tab(s) orally once a day (at bedtime) -for constipation MDD:2

## 2022-01-11 NOTE — DISCHARGE NOTE PROVIDER - NSDCFUSCHEDAPPT_GEN_ALL_CORE_FT
[facility] ; 02/02/2022 ; [facility] [address] ; 03/01/2022 ; [facility] [address] ; 04/04/2022 ; [facility] [address]

## 2022-01-11 NOTE — DISCHARGE NOTE PROVIDER - HOSPITAL COURSE
The patient underwent a LEFT POSTERIOR TOTAL HIP REPLACEMENT on 1/11/2022. The patient received antibiotics consistent with SCIP guidelines. The patient underwent the procedure and had no intra-operative complications. Post-operatively, the patient was seen by medicine and PT. The patient received ASPIRIN for DVTP. The patient received pain medications per orthopedic pain management pathway and the pain was appropriately controlled. Patient was instructed on posterior total hip precautions by PT. The patient did not have any post-operative medical complications. The patient was discharged in stable condition.

## 2022-01-11 NOTE — CONSULT NOTE ADULT - ASSESSMENT
60 y/o male with PMH of asthma and basal cell carcinoma presents to PST with c/o of left hip pain. Reports hx of left hip pain for about 4 years but is becoming progressively worse. Pain radiates to left groin. Aggravated by ambulation and alleviated by sitting. Takes tramadol for pain with some effect.      1. L hip arthritis , s/p L DELVIN ,   PT/OT/pain mgmt  DVT prophylaxis- as per ortho  Abx as per SCIP  Incentive spirometry  Prophylaxis of opioid  induced constipation.     2. Hx of Asthma - stable - stable , on QVAR may use own  or if not available may start on Symbicort     3. DVT prophylaxis  - as per ortho protocol  Opioid induced constipation  prophylaxis - bowel regimen     Thank you for the courtesy of this consult ,   will follow .

## 2022-01-11 NOTE — PROGRESS NOTE ADULT - SUBJECTIVE AND OBJECTIVE BOX
Orthopedic PA Postop Note  Patient S/P LEFT DELVIN  Patient in bed comfortable   LEFT Leg  Dressing C/D/I  DP Pulse intact  Calf Soft NT  Dorsi/Plantar Flexion/EHL/FHL Intact  Sensation intact to light touch  Abduction Pillow in place     Vital Signs Last 24 Hrs  T(C): 36.2 (11 Jan 2022 20:00), Max: 36.4 (11 Jan 2022 11:17)  T(F): 97.2 (11 Jan 2022 20:00), Max: 97.6 (11 Jan 2022 11:17)  HR: 75 (11 Jan 2022 20:00) (55 - 75)  BP: 143/78 (11 Jan 2022 20:00) (116/61 - 157/77)  BP(mean): 90 (11 Jan 2022 19:30) (75 - 97)  RR: 19 (11 Jan 2022 20:00) (13 - 19)  SpO2: 100% (11 Jan 2022 20:00) (97% - 100%)        A/P:  S/P LEFT DELVIN  1. DVTP - ASA  2. Physical Therapy - Posterior Precautions  3. Pain Control as clinically indicated

## 2022-01-11 NOTE — CONSULT NOTE ADULT - SUBJECTIVE AND OBJECTIVE BOX
PMD :  Cardio :     Patient is a 61y old  Male who presents with a chief complaint of Left total hip arthroplasty  (11 Jan 2022 16:55)      HPI:  62 y/o male with PMH of asthma and basal cell carcinoma presents to PST with c/o of left hip pain. Reports hx of left hip pain for about 4 years but is becoming progressively worse. Pain radiates to left groin. Aggravated by ambulation and alleviated by sitting. Takes tramadol for pain with some effect.      PAST MEDICAL & SURGICAL HISTORY:  Asthma    Skin cancer, basal cell    History of back surgery  2014    H/O arthroscopy of knee  Bilateral- 1976,2002,2012        Social History:  Tobacco - ex smoker , quit 30 yrs ago   ETOH -  1-2 drinks 3-4 times/ month   Illicit drug abuse - denies    FAMILY HISTORY:  FHx: lung cancer (Mother)        Allergies    No Known Allergies    Intolerances        HOME MEDICATIONS :   · 	traMADol 50 mg oral tablet: Last Dose Taken:    · 	Qvar 40 mcg/inh inhalation aerosol: Last Dose Taken:  , 1 puff(s) inhaled 2 times a day  · 	albuterol 0.63 mg/3 mL (0.021%) inhalation solution: Last Dose Taken:        ROS:   L hip chronic pain , all other systems are reviewed and are negative     MEDICATIONS  (STANDING):  ceFAZolin   IVPB 2000 milliGRAM(s) IV Intermittent <User Schedule>  lactated ringers. 1000 milliLiter(s) (75 mL/Hr) IV Continuous <Continuous>  sodium chloride 0.9% Bolus 500 milliLiter(s) IV Bolus once  sodium chloride 0.9%. 1000 milliLiter(s) (125 mL/Hr) IV Continuous <Continuous>    MEDICATIONS  (PRN):  fentaNYL    Injectable 25 MICROGram(s) IV Push every 5 minutes PRN Moderate Pain (4 - 6)      Vital Signs Last 24 Hrs  T(C): 36.1 (11 Jan 2022 16:35), Max: 36.4 (11 Jan 2022 11:17)  T(F): 97 (11 Jan 2022 16:35), Max: 97.6 (11 Jan 2022 11:17)  HR: 56 (11 Jan 2022 16:50) (55 - 58)  BP: 123/70 (11 Jan 2022 16:50) (116/61 - 156/81)  BP(mean): 83 (11 Jan 2022 16:50) (75 - 84)  RR: 14 (11 Jan 2022 16:50) (13 - 16)  SpO2: 98% (11 Jan 2022 16:50) (97% - 100%)    PHYSICAL EXAM:    GENERAL: NAD, well-groomed, well-developed  HEAD:  Atraumatic, Normocephalic  EYES: EOMI, PERRLA, conjunctiva and sclera clear  NECK: Supple, No JVD, Normal thyroid  NERVOUS SYSTEM:  Alert & Oriented X no focal deficit   CHEST/LUNG: CTA  b/l,  no rales, rhonchi, wheezing, or rubs  HEART: Regular rate and rhythm; No murmurs, rubs, or gallops  ABDOMEN: Soft, Nontender, Nondistended; Bowel sounds present  EXTREMITIES:  2+ Peripheral Pulses, No clubbing, cyanosis, or edema ,   L hip dressing + , clean and dry   LYMPH: No lymphadenopathy noted  SKIN: No rashes or lesions    LABS: Pending     RADIOLOGY & ADDITIONAL STUDIES:

## 2022-01-11 NOTE — DISCHARGE NOTE PROVIDER - CARE PROVIDER_API CALL
Pipe Gomez)  Orthopaedic Surgery  [address], [address]  [place] [address]  Phone: [phone]  Fax: [phone]  Follow Up Time:

## 2022-01-11 NOTE — DISCHARGE NOTE PROVIDER - CARE PROVIDERS DIRECT ADDRESSES
[email or web address]

## 2022-01-12 ENCOUNTER — TRANSCRIPTION ENCOUNTER (OUTPATIENT)
Age: 62
End: 2022-01-12

## 2022-01-12 VITALS
SYSTOLIC BLOOD PRESSURE: 136 MMHG | HEART RATE: 68 BPM | DIASTOLIC BLOOD PRESSURE: 72 MMHG | RESPIRATION RATE: 18 BRPM | TEMPERATURE: 98 F | OXYGEN SATURATION: 96 %

## 2022-01-12 LAB
ANION GAP SERPL CALC-SCNC: 14 MMOL/L — SIGNIFICANT CHANGE UP (ref 5–17)
BUN SERPL-MCNC: 31.6 MG/DL — HIGH (ref 8–20)
CALCIUM SERPL-MCNC: 8.5 MG/DL — LOW (ref 8.6–10.2)
CHLORIDE SERPL-SCNC: 104 MMOL/L — SIGNIFICANT CHANGE UP (ref 98–107)
CO2 SERPL-SCNC: 18 MMOL/L — LOW (ref 22–29)
CREAT SERPL-MCNC: 0.89 MG/DL — SIGNIFICANT CHANGE UP (ref 0.5–1.3)
GLUCOSE SERPL-MCNC: 168 MG/DL — HIGH (ref 70–99)
HCT VFR BLD CALC: 37.5 % — LOW (ref 39–50)
HGB BLD-MCNC: 12.9 G/DL — LOW (ref 13–17)
MCHC RBC-ENTMCNC: 32.4 PG — SIGNIFICANT CHANGE UP (ref 27–34)
MCHC RBC-ENTMCNC: 34.4 GM/DL — SIGNIFICANT CHANGE UP (ref 32–36)
MCV RBC AUTO: 94.2 FL — SIGNIFICANT CHANGE UP (ref 80–100)
PLATELET # BLD AUTO: 262 K/UL — SIGNIFICANT CHANGE UP (ref 150–400)
POTASSIUM SERPL-MCNC: 4.7 MMOL/L — SIGNIFICANT CHANGE UP (ref 3.5–5.3)
POTASSIUM SERPL-SCNC: 4.7 MMOL/L — SIGNIFICANT CHANGE UP (ref 3.5–5.3)
RBC # BLD: 3.98 M/UL — LOW (ref 4.2–5.8)
RBC # FLD: 11.9 % — SIGNIFICANT CHANGE UP (ref 10.3–14.5)
SODIUM SERPL-SCNC: 136 MMOL/L — SIGNIFICANT CHANGE UP (ref 135–145)
WBC # BLD: 15.92 K/UL — HIGH (ref 3.8–10.5)
WBC # FLD AUTO: 15.92 K/UL — HIGH (ref 3.8–10.5)

## 2022-01-12 PROCEDURE — 36415 COLL VENOUS BLD VENIPUNCTURE: CPT

## 2022-01-12 PROCEDURE — C1713: CPT

## 2022-01-12 PROCEDURE — 97167 OT EVAL HIGH COMPLEX 60 MIN: CPT

## 2022-01-12 PROCEDURE — 80048 BASIC METABOLIC PNL TOTAL CA: CPT

## 2022-01-12 PROCEDURE — 86901 BLOOD TYPING SEROLOGIC RH(D): CPT

## 2022-01-12 PROCEDURE — 85027 COMPLETE CBC AUTOMATED: CPT

## 2022-01-12 PROCEDURE — 86900 BLOOD TYPING SEROLOGIC ABO: CPT

## 2022-01-12 PROCEDURE — 86850 RBC ANTIBODY SCREEN: CPT

## 2022-01-12 PROCEDURE — 99233 SBSQ HOSP IP/OBS HIGH 50: CPT

## 2022-01-12 PROCEDURE — C1776: CPT

## 2022-01-12 PROCEDURE — 82962 GLUCOSE BLOOD TEST: CPT

## 2022-01-12 PROCEDURE — 73501 X-RAY EXAM HIP UNI 1 VIEW: CPT

## 2022-01-12 RX ORDER — CELECOXIB 200 MG/1
1 CAPSULE ORAL
Qty: 42 | Refills: 0
Start: 2022-01-12 | End: 2022-02-01

## 2022-01-12 RX ORDER — SENNOSIDES/DOCUSATE SODIUM 8.6MG-50MG
2 TABLET ORAL
Qty: 14 | Refills: 0
Start: 2022-01-12 | End: 2022-01-18

## 2022-01-12 RX ORDER — OXYCODONE HYDROCHLORIDE 5 MG/1
1 TABLET ORAL
Qty: 28 | Refills: 0
Start: 2022-01-12 | End: 2022-01-18

## 2022-01-12 RX ORDER — ALBUTEROL 90 UG/1
2 AEROSOL, METERED ORAL
Qty: 0 | Refills: 0 | DISCHARGE
Start: 2022-01-12

## 2022-01-12 RX ORDER — ASPIRIN/CALCIUM CARB/MAGNESIUM 324 MG
1 TABLET ORAL
Qty: 84 | Refills: 0
Start: 2022-01-12 | End: 2022-02-22

## 2022-01-12 RX ORDER — ALBUTEROL 90 UG/1
0 AEROSOL, METERED ORAL
Qty: 0 | Refills: 0 | DISCHARGE

## 2022-01-12 RX ORDER — OMEPRAZOLE 10 MG/1
1 CAPSULE, DELAYED RELEASE ORAL
Qty: 42 | Refills: 0
Start: 2022-01-12 | End: 2022-02-22

## 2022-01-12 RX ORDER — ACETAMINOPHEN 500 MG
3 TABLET ORAL
Qty: 0 | Refills: 0 | DISCHARGE
Start: 2022-01-12

## 2022-01-12 RX ORDER — TRAMADOL HYDROCHLORIDE 50 MG/1
0 TABLET ORAL
Qty: 0 | Refills: 0 | DISCHARGE

## 2022-01-12 RX ADMIN — Medication 15 MILLIGRAM(S): at 00:05

## 2022-01-12 RX ADMIN — OXYCODONE HYDROCHLORIDE 5 MILLIGRAM(S): 5 TABLET ORAL at 10:42

## 2022-01-12 RX ADMIN — PANTOPRAZOLE SODIUM 40 MILLIGRAM(S): 20 TABLET, DELAYED RELEASE ORAL at 05:19

## 2022-01-12 RX ADMIN — CELECOXIB 200 MILLIGRAM(S): 200 CAPSULE ORAL at 10:29

## 2022-01-12 RX ADMIN — CELECOXIB 200 MILLIGRAM(S): 200 CAPSULE ORAL at 10:40

## 2022-01-12 RX ADMIN — Medication 975 MILLIGRAM(S): at 05:19

## 2022-01-12 RX ADMIN — Medication 975 MILLIGRAM(S): at 06:56

## 2022-01-12 RX ADMIN — OXYCODONE HYDROCHLORIDE 5 MILLIGRAM(S): 5 TABLET ORAL at 10:33

## 2022-01-12 RX ADMIN — Medication 15 MILLIGRAM(S): at 06:55

## 2022-01-12 RX ADMIN — Medication 15 MILLIGRAM(S): at 10:30

## 2022-01-12 RX ADMIN — Medication 15 MILLIGRAM(S): at 05:21

## 2022-01-12 RX ADMIN — Medication 325 MILLIGRAM(S): at 05:19

## 2022-01-12 RX ADMIN — Medication 15 MILLIGRAM(S): at 10:40

## 2022-01-12 RX ADMIN — Medication 100 MILLIGRAM(S): at 05:40

## 2022-01-12 RX ADMIN — Medication 15 MILLIGRAM(S): at 00:30

## 2022-01-12 NOTE — PROGRESS NOTE ADULT - SUBJECTIVE AND OBJECTIVE BOX
SEBASTIEN SAÚL    [id number]    History: Patient is status post left posterior total hip arthroplasty on 1/11, POD#1. Patient is doing well. The patient's pain is controlled using the prescribed pain medications. The patient is participating in physical therapy. Denies nausea, vomiting, chest pain, shortness of breath, abdominal pain or fever. No new complaints.                            12.9   15.92 )-----------( 262      ( 12 Jan 2022 07:07 )             37.5                 Physical exam: The left hip dressing is clean, dry and intact. +abduction pillow. No drainage or discharge. No erythema is noted. No blistering. No ecchymosis. The calf is supple nontender. Sensation to light touch is grossly intact distally. +dorsi/plantarflexion/EHL/FHL. No foot drop. 2+ dorsalis pedis pulse. Capillary refill is less than 2 seconds. No cyanosis.      Primary Orthopedic Assessment:  • s/p LEFT POSTERIOR total hip replacement, POD#1        Plan:   * F/U AM labs  • DVT prophylaxis as prescribed, including use of compression devices and ankle pumps  • Continue physical therapy  • Weightbearing as tolerated of the right lower extremity with assistance of a walker  • Incentive spirometry encouraged  • Pain control as clinically indicated  • Posterior hip precautions reviewed with patient  • Discharge planning – anticipated discharge is Home today when cleared by PT and medicine

## 2022-01-12 NOTE — PHYSICAL THERAPY INITIAL EVALUATION ADULT - ASSISTIVE DEVICE FOR STAIR TRANSFER, REHAB EVAL
Pt educated on using left and right rails with opposite hand using SAC: Independent with both strategies/straight cane/right rail up/left rail up

## 2022-01-12 NOTE — DISCHARGE NOTE NURSING/CASE MANAGEMENT/SOCIAL WORK - PATIENT PORTAL LINK FT
You can access the FollowMyHealth Patient Portal offered by [facility] by registering at the following website: [email or web address]. By joining [facility] to [facility]s FollowMyHealth portal, you will also be able to view your health information using other applications (apps) compatible with our system.

## 2022-01-12 NOTE — PROGRESS NOTE ADULT - ASSESSMENT
60 y/o male with PMH of asthma and basal cell carcinoma presents to PST with c/o of left hip pain. Reports hx of left hip pain for about 4 years but is becoming progressively worse. Pain radiates to left groin. Aggravated by ambulation and alleviated by sitting. Takes tramadol for pain with some effect.      1. L hip arthritis , s/p L DELVIN ,   PT/OT/pain mgmt  DVT prophylaxis- as per ortho  Abx as per SCIP- given   Incentive spirometry  Prophylaxis of opioid  induced constipation.     2. Hx of Asthma - stable , uses Qvar - brought own medications -   may use it     3. DVT prophylaxis  - as per ortho protocol- ASA BID   Opioid induced constipation  prophylaxis - bowel regimen     4. Preop A1C noted - 5.8 - states aware and was given Rx by his PMD to start after surgery ,   not sure of name of medications . Diabetic educations provided-   stay on ADA , change life style , follow up with PMD as recommended     5. Postop leucocytosis - no S/S of infection - likely reactive     6. Anemia - acute blood lost anemia - secondary to surgical blood lost -  asymptomatic.         Dispo plan is Home - likely today ,   d/w ortho PA/ CM   Medically stable to d/c once cleared by physical therapy / ortho .

## 2022-01-12 NOTE — PHYSICAL THERAPY INITIAL EVALUATION ADULT - ACTIVE RANGE OF MOTION EXAMINATION, REHAB EVAL
Left LE grossly 2/5 / posterior Hip Precautions Maintained/bilateral upper extremity Active ROM was WFL (within functional limits)/bilateral  lower extremity Active ROM was WFL (within functional limits)/deficits as listed below

## 2022-01-12 NOTE — PROGRESS NOTE ADULT - SUBJECTIVE AND OBJECTIVE BOX
Patient seen and examined . S/p L DELVIN  , POD # 1. Pain well controlled , no n/v ,   voiding without difficulty , participating with physical therapy .     CC : L hip chronic pain post op well controlled           MEDICATIONS  (STANDING):  acetaminophen     Tablet .. 975 milliGRAM(s) Oral every 8 hours  aspirin 325 milliGRAM(s) Oral two times a day  celecoxib 200 milliGRAM(s) Oral every 12 hours  ketorolac   Injectable 15 milliGRAM(s) IV Push every 6 hours  pantoprazole    Tablet 40 milliGRAM(s) Oral before breakfast  polyethylene glycol 3350 17 Gram(s) Oral at bedtime  Qvar 80 MICROGram(s) 1 Inhalation Oral two times a day  senna 2 Tablet(s) Oral at bedtime  sodium chloride 0.9%. 1000 milliLiter(s) (125 mL/Hr) IV Continuous <Continuous>    MEDICATIONS  (PRN):  ALBUTerol    90 MICROgram(s) HFA Inhaler 2 Puff(s) Inhalation every 6 hours PRN Shortness of Breath and/or Wheezing  aluminum hydroxide/magnesium hydroxide/simethicone Suspension 30 milliLiter(s) Oral four times a day PRN Indigestion  HYDROmorphone   Tablet 4 milliGRAM(s) Oral every 3 hours PRN Severe Pain (7 - 10)  HYDROmorphone  Injectable 0.5 milliGRAM(s) IV Push every 4 hours PRN breakthrough pain  magnesium hydroxide Suspension 30 milliLiter(s) Oral daily PRN Constipation  ondansetron Injectable 4 milliGRAM(s) IV Push every 6 hours PRN Nausea and/or Vomiting  oxyCODONE    IR 5 milliGRAM(s) Oral every 3 hours PRN Mild Pain (1 - 3)  oxyCODONE    IR 10 milliGRAM(s) Oral every 3 hours PRN Moderate Pain (4 - 6)      LABS:                          12.9   15.92 )-----------( 262      ( 12 Jan 2022 07:07 )             37.5     01-12    136  |  104  |  31.6<H>  ----------------------------<  168<H>  4.7   |  18.0<L>  |  0.89    Ca    8.5<L>      12 Jan 2022 07:07        RADIOLOGY & ADDITIONAL TESTS:          REVIEW OF SYSTEMS:    L hip chronic pain postop well controlled ,   all other systems are reviewed and are negative .    Vital Signs Last 24 Hrs  T(C): 36.4 (12 Jan 2022 08:56), Max: 36.5 (12 Jan 2022 04:02)  T(F): 97.5 (12 Jan 2022 08:56), Max: 97.7 (12 Jan 2022 04:02)  HR: 68 (12 Jan 2022 08:56) (55 - 79)  BP: 136/72 (12 Jan 2022 08:56) (116/61 - 157/77)  BP(mean): 90 (11 Jan 2022 19:30) (75 - 97)  RR: 18 (12 Jan 2022 08:56) (13 - 19)  SpO2: 96% (12 Jan 2022 08:56) (96% - 100%)    PHYSICAL EXAM:    GENERAL: NAD, well-groomed, well-developed  HEAD:  Atraumatic, Normocephalic  EYES: EOMI, PERRLA, conjunctiva and sclera clear  NECK: Supple, No JVD, Normal thyroid  NERVOUS SYSTEM:  Alert & Oriented X3, no focal deficit  CHEST/LUNG: CTA b/l ,  no  rales, rhonchi, wheezing, or rubs  HEART: Regular rate and rhythm; No murmurs, rubs, or gallops  ABDOMEN: Soft, Nontender, Nondistended; Bowel sounds present  EXTREMITIES:  2+ Peripheral Pulses, No clubbing, cyanosis, or edema,   L hip dressing + , clean and dry   LYMPH: No lymphadenopathy noted  SKIN: No rashes or lesions

## 2022-01-12 NOTE — DISCHARGE NOTE NURSING/CASE MANAGEMENT/SOCIAL WORK - NSDCPEFALRISK_GEN_ALL_CORE
For information on Fall & Injury Prevention, visit: [email or web address] OR  [email or web address] OR  https://www.cdc.gov/steadi/patient.html

## 2022-01-12 NOTE — PHYSICAL THERAPY INITIAL EVALUATION ADULT - ADDITIONAL COMMENTS
Pt lives in a house with 3 steps to enter and 12 stairs inside with  rails.  Pt owns medical equipment: RW, SAC  Pt lives with: Spouse  Someone is always available to provide assist.

## 2022-01-12 NOTE — PHYSICAL THERAPY INITIAL EVALUATION ADULT - GAIT TRAINING, PT EVAL
Time Frame:  1-2   days   Goal:  Modified Independent  with RW x 300 feet / Stairs: pt will navigate 12  stairs independent

## 2022-01-19 LAB — GLUCOSE BLDC GLUCOMTR-MCNC: 109 MG/DL — HIGH (ref 70–99)

## 2022-02-02 ENCOUNTER — APPOINTMENT (OUTPATIENT)
Dept: ORTHOPEDIC SURGERY | Facility: CLINIC | Age: 62
End: 2022-02-02
Payer: COMMERCIAL

## 2022-02-02 VITALS
BODY MASS INDEX: 32.33 KG/M2 | WEIGHT: 260 LBS | SYSTOLIC BLOOD PRESSURE: 135 MMHG | HEART RATE: 54 BPM | HEIGHT: 75 IN | DIASTOLIC BLOOD PRESSURE: 82 MMHG

## 2022-02-02 PROCEDURE — 99024 POSTOP FOLLOW-UP VISIT: CPT

## 2022-02-02 PROCEDURE — 73502 X-RAY EXAM HIP UNI 2-3 VIEWS: CPT

## 2022-02-02 RX ORDER — OXYCODONE 10 MG/1
10 TABLET ORAL
Qty: 30 | Refills: 0 | Status: ACTIVE | COMMUNITY
Start: 2022-01-19 | End: 1900-01-01

## 2022-02-02 NOTE — END OF VISIT
[FreeTextEntry3] : I, Rey Jay, acted solely as a scribe for Dr. Pipe Gomez on this date 02/02/2022.

## 2022-02-02 NOTE — HISTORY OF PRESENT ILLNESS
[Procedure: ___] : status post [unfilled] [Clean/Dry/Intact] : clean, dry and intact [Healed] : healed [Swelling] : swollen [Neuro Intact] : an unremarkable neurological exam [Vascular Intact] : ~T peripheral vascular exam normal [Negative Maya's] : maneuvers demonstrated a negative Maya's sign [Xray (Date:___)] : [unfilled] Xray -  [Doing Well] : is doing well [No Sign of Infection] : is showing no signs of infection [1] : the patient reports pain that is 1/10 in severity [Chills] : no chills [Constipation] : no constipation [Diarrhea] : no diarrhea [Dysuria] : no dysuria [Fever] : no fever [Nausea] : no nausea [Vomiting] : no vomiting [Erythema] : not erythematous [Discharge] : absent of discharge [Dehiscence] : not dehisced [de-identified] : S/P 1/11/22 Left total hip arthroplasty DOS:1/11/22. [de-identified] : Pt is 3 weeks post-op. He is in outpatient PT. He is taking oxycodone 10 mg once a day. He is taking Celebrex. He is taking ASA 81 mg daily. He has difficulty sleeping. He is ambulating without any assistive devices.  [de-identified] : Left hip exam shows healing incision with no sign of infection. Small scab at superior part of the incision.  [de-identified] : 3V xray of the left hip done in the office today and reviewed by Dr. Pipe Gomez demonstrates s/p implants in good positioning with no evidence of wear, loosening, or subsidence.  [de-identified] : Pt should continue taking aspirin BID for DVTP. He should continue to do low impact exercises. Patient will continue to adhere to the posterior hip precautions. Pt understands the importance of prophylaxis for invasive dental procedures. We gave him another rx for oxycodone. F/u with us in 3 weeks.

## 2022-03-01 ENCOUNTER — APPOINTMENT (OUTPATIENT)
Dept: ORTHOPEDIC SURGERY | Facility: CLINIC | Age: 62
End: 2022-03-01
Payer: COMMERCIAL

## 2022-03-01 VITALS
HEART RATE: 51 BPM | SYSTOLIC BLOOD PRESSURE: 154 MMHG | HEIGHT: 75 IN | BODY MASS INDEX: 32.33 KG/M2 | WEIGHT: 260 LBS | DIASTOLIC BLOOD PRESSURE: 79 MMHG

## 2022-03-01 PROCEDURE — 99024 POSTOP FOLLOW-UP VISIT: CPT

## 2022-03-01 PROCEDURE — 73502 X-RAY EXAM HIP UNI 2-3 VIEWS: CPT | Mod: LT

## 2022-04-04 ENCOUNTER — APPOINTMENT (OUTPATIENT)
Dept: ORTHOPEDIC SURGERY | Facility: CLINIC | Age: 62
End: 2022-04-04
Payer: COMMERCIAL

## 2022-04-04 PROCEDURE — 73502 X-RAY EXAM HIP UNI 2-3 VIEWS: CPT

## 2022-04-04 PROCEDURE — 99024 POSTOP FOLLOW-UP VISIT: CPT

## 2022-04-04 RX ORDER — AMOXICILLIN 500 MG/1
500 TABLET, FILM COATED ORAL
Qty: 4 | Refills: 2 | Status: ACTIVE | COMMUNITY
Start: 2022-04-04 | End: 1900-01-01

## 2022-04-04 NOTE — HISTORY OF PRESENT ILLNESS
[0] : no pain reported [Xray (Date:___)] : [unfilled] Xray -  [Doing Well] : is doing well [Excellent Pain Control] : has excellent pain control [No Sign of Infection] : is showing no signs of infection [Chills] : no chills [Constipation] : no constipation [Diarrhea] : no diarrhea [Dysuria] : no dysuria [Fever] : no fever [Nausea] : no nausea [Vomiting] : no vomiting [de-identified] : s/p left total hip arthroplasty 1/11/22 [de-identified] : He is 3 months from left DELVIN. He denies pain. He denies any limits with ADLs. He only had pain for  about 3 days after surgery. He ambulates without any assistive devices. He is happy with the surgical outcome. [de-identified] : Left hip exam shows healed incision with no sign of infection. The surgical incision site(s) swollen, but clean, dry and intact, healed, not erythematous, absent of discharge and not dehisced. Additional findings included an unremarkable neurological exam, peripheral vascular exam normal and maneuvers demonstrated a negative Maya's sign.  [de-identified] : 3V xray of the left hip done in the office today and demonstrates s/p implants in good positioning with no evidence of wear, loosening, or subsidence.  [de-identified] : He should continue to do low impact exercises. Pt understands the importance of prophylaxis for invasive dental procedures. We gave him amoxicillin for his upcoming dental work. F/u with us a year from surgery.

## 2022-04-04 NOTE — HISTORY OF PRESENT ILLNESS
[Xray (Date:___)] : [unfilled] Xray -  [Doing Well] : is doing well [Excellent Pain Control] : has excellent pain control [No Sign of Infection] : is showing no signs of infection [Chills] : no chills [Constipation] : no constipation [Dysuria] : no dysuria [Fever] : no fever [de-identified] : status post 1/11/2022. S/P 1/11/22 Left total hip arthroplasty DOS:1/11/22. [de-identified] : pt is 7 weeks from left TKA\par \par he is in PT 2 times per week\par he D/C ed all  post op medication \par he is taking tramadol 1-2 tabs a day by pain management dr ricketts. \par \par  [de-identified] : Left hip exam shows healing incision with no sign of infection. Small scab at superior part of the incision. The surgical incision site(s) swollen, but clean, dry and intact, healed, not erythematous, absent of discharge and not dehisced. Additional findings included an unremarkable neurological exam, peripheral vascular exam normal and maneuvers demonstrated a negative Maya's sign.  [de-identified] : 3V xray of the left hip done in the office today and  demonstrates s/p implants in good positioning with no evidence of wear, loosening, or subsidence. \par \par  [de-identified] : He should continue to do low impact exercises. Patient will continue to adhere to the posterior hip precautions. Pt understands the importance of prophylaxis for invasive dental procedures. We gave him another rx for oxycodone. F/u with us in 3 weeks. \par

## 2022-04-04 NOTE — END OF VISIT
[FreeTextEntry3] : I, Rey Jay, acted solely as a scribe for Dr. Pipe Gomez on this date 04/04/2022.

## 2022-09-05 ENCOUNTER — EMERGENCY (EMERGENCY)
Facility: HOSPITAL | Age: 62
LOS: 1 days | Discharge: HOME | End: 2022-09-05
Attending: EMERGENCY MEDICINE
Payer: COMMERCIAL

## 2022-09-05 VITALS
HEIGHT: 75 IN | SYSTOLIC BLOOD PRESSURE: 126 MMHG | DIASTOLIC BLOOD PRESSURE: 67 MMHG | OXYGEN SATURATION: 98 % | RESPIRATION RATE: 20 BRPM | HEART RATE: 67 BPM | TEMPERATURE: 98 F | WEIGHT: 259.93 LBS

## 2022-09-05 DIAGNOSIS — Z98.890 OTHER SPECIFIED POSTPROCEDURAL STATES: Chronic | ICD-10-CM

## 2022-09-05 PROBLEM — C44.91 BASAL CELL CARCINOMA OF SKIN, UNSPECIFIED: Chronic | Status: ACTIVE | Noted: 2021-12-22

## 2022-09-05 PROBLEM — J45.909 UNSPECIFIED ASTHMA, UNCOMPLICATED: Chronic | Status: ACTIVE | Noted: 2021-12-22

## 2022-09-05 PROCEDURE — 73562 X-RAY EXAM OF KNEE 3: CPT | Mod: 26,RT

## 2022-09-05 PROCEDURE — 73562 X-RAY EXAM OF KNEE 3: CPT

## 2022-09-05 PROCEDURE — 99283 EMERGENCY DEPT VISIT LOW MDM: CPT

## 2022-09-05 RX ORDER — IBUPROFEN 200 MG
1 TABLET ORAL
Qty: 16 | Refills: 0
Start: 2022-09-05

## 2022-09-05 RX ORDER — IBUPROFEN 200 MG
600 TABLET ORAL ONCE
Refills: 0 | Status: COMPLETED | OUTPATIENT
Start: 2022-09-05 | End: 2022-09-05

## 2022-09-05 RX ADMIN — Medication 600 MILLIGRAM(S): at 12:57

## 2022-09-05 NOTE — ED PROVIDER NOTE - ATTENDING APP SHARED VISIT CONTRIBUTION OF CARE
62 y/o M with hx knee surgeries/no ACL R knee presents  c/o R knee pain and swelling  xray -no fx  likely ligamentous injury  pt not able to tolerate knee immobilizer. applied ace wrap., advised RICE and f/u ortho  pt has crutches at home

## 2022-09-05 NOTE — ED ADULT TRIAGE NOTE - CHIEF COMPLAINT QUOTE
Pt arrives to ED c/o R knee pain and swelling " I twisted my  leg last night ' Denies any head injury

## 2022-09-05 NOTE — ED PROVIDER NOTE - PHYSICAL EXAMINATION
Gen: No acute distress, non toxic  HEENT: Mucous membranes moist, pink conjunctivae, EOMI  Neuro: A&O x 3, moving all 4 extremities  MSK: +swelling right knee, tender medially, limited ROM due to pain/swelling, 2+ PT/DP pulses, sensation intact. no ecchymosis  Skin: No rashes. intact and perfused.

## 2022-09-05 NOTE — ED PROVIDER NOTE - OBJECTIVE STATEMENT
60 y/o M presents to ER c/o right knee pain and swelling. states last night he was walking got foot caught as he fell and twisted right knee. denies hitting head or LOC. c/o pain/swelling to knee with limited ROM and difficulty bearing weight. pt sees ortho Dr. Gomez states he has no ACL in the R knee and has had several knee surgeries in the past. took oxycodone PTA which he is prescribed for his chronic hip and knee pain

## 2022-09-05 NOTE — ED ADULT NURSE NOTE - OBJECTIVE STATEMENT
Assumed pt care at 1222 in [facility].  Pt c/o right knee pain.  Swelling present. Unable to bear weight.

## 2022-09-05 NOTE — ED PROVIDER NOTE - CLINICAL SUMMARY MEDICAL DECISION MAKING FREE TEXT BOX
60 y/o M with hx knee surgeries/no ACL R knee presents  c/o R knee pain and swelling  xray -no fx  likely ligamentous injury  pt not able to tolerate knee immobilizer. applied ace wrap., advised RICE and f/u ortho  pt has crutches at home

## 2022-09-05 NOTE — ED PROVIDER NOTE - NSFOLLOWUPINSTRUCTIONS_ED_ALL_ED_FT
Apply ice to area, rest, elevate extremity  Please take ibuprofen 600mg every 6 hours as needed for pain  Please take tylenol 650mg every 6hours as needed for pain  Follow up with orthopedics within 2-3 days

## 2022-09-05 NOTE — ED PROVIDER NOTE - CARE PROVIDER_API CALL
Pipe Gomez)  Orthopaedic Surgery  [address]  [place] [address]  Phone: [phone]  Fax: [phone]  Follow Up Time:

## 2022-09-05 NOTE — ED PROVIDER NOTE - PATIENT PORTAL LINK FT
You can access the FollowMyHealth Patient Portal offered by [facility] by registering at the following website: [email or web address]. By joining [facility]’s FollowMyHealth portal, you will also be able to view your health information using other applications (apps) compatible with our system.

## 2022-10-03 NOTE — PATIENT PROFILE ADULT - FOOD INSECURITY
no Initiate Treatment: Minocycline 100mg BID x 2 months Render In Strict Bullet Format?: No Continue Regimen: Tretinoin 0.025% cream QHS Detail Level: Zone Initiate Treatment: Fluconazole 150mg x 3 days

## 2022-11-04 ENCOUNTER — APPOINTMENT (OUTPATIENT)
Dept: ORTHOPEDIC SURGERY | Facility: CLINIC | Age: 62
End: 2022-11-04

## 2022-11-04 VITALS
BODY MASS INDEX: 32.33 KG/M2 | HEIGHT: 75 IN | DIASTOLIC BLOOD PRESSURE: 81 MMHG | HEART RATE: 58 BPM | SYSTOLIC BLOOD PRESSURE: 135 MMHG | WEIGHT: 260 LBS

## 2022-11-04 DIAGNOSIS — Z96.642 PRESENCE OF LEFT ARTIFICIAL HIP JOINT: ICD-10-CM

## 2022-11-04 DIAGNOSIS — M17.11 UNILATERAL PRIMARY OSTEOARTHRITIS, RIGHT KNEE: ICD-10-CM

## 2022-11-04 DIAGNOSIS — Z96.642 AFTERCARE FOLLOWING JOINT REPLACEMENT SURGERY: ICD-10-CM

## 2022-11-04 DIAGNOSIS — M16.11 UNILATERAL PRIMARY OSTEOARTHRITIS, RIGHT HIP: ICD-10-CM

## 2022-11-04 DIAGNOSIS — Z47.1 AFTERCARE FOLLOWING JOINT REPLACEMENT SURGERY: ICD-10-CM

## 2022-11-04 PROCEDURE — 73562 X-RAY EXAM OF KNEE 3: CPT | Mod: 26,RT

## 2022-11-04 PROCEDURE — 99214 OFFICE O/P EST MOD 30 MIN: CPT

## 2022-11-04 PROCEDURE — 73502 X-RAY EXAM HIP UNI 2-3 VIEWS: CPT | Mod: 26,RT

## 2022-11-04 RX ORDER — CELECOXIB 200 MG/1
200 CAPSULE ORAL TWICE DAILY
Qty: 42 | Refills: 0 | Status: ACTIVE | COMMUNITY
Start: 2022-11-04 | End: 1900-01-01

## 2022-11-04 NOTE — DISCUSSION/SUMMARY
[de-identified] : This is a 62-year-old male with end-stage right knee osteoarthritis and mild right hip osteoarthritis and symptoms.\par He has failed to conservative treatment and recently he sustained a fall  because of the right knee dysfunction.\par He is a candidate for knee replacement and he would like to pursue surgical treatment.  He is a status post left hip replacement in January 2022 with good outcome.  We discussed about risk for persistent flexion contraction even after surgery.   I provided prescription for Celebrex our surgical  coordinator will contact the patient.  All questions asked were answered regarding his pre and postop process. \par \par The patient is a  62 year individual with end stage arthritis of their right knee joint. Based upon the patient's continued symptoms and failure to respond to conservative treatment , including Tylenol and anti-inflammatories, physical therapy, activity modification , local modalities such as apply ice or heat, and injections. I have recommended a total knee arthroplasty for this patient. A long discussion took place with the patient describing what a total joint replacement is and what the procedure would entail. A total knee arthroplasty model, similar to the implant that was used during the operation, was utilized to demonstrate and to discuss the various bearing surfaces of the implants. The hospitalization and post-operative care and rehabilitation were also discussed. The use of perioperative antibiotics and DVT prophylaxis were discussed. The risk, benefits and alternatives to a surgical intervention were discussed at length with the patient. The patient was also advised of risks related to the medical comorbidities, elevated body mass index (BMI), and smoking where applicable. We discussed how to reduce modifiable risk factors and encouraged smoking cessation were applicable.. A lengthy discussion took place to review the most common complications including but not limited to: deep vein thrombosis, pulmonary embolus, heart attack, stroke, infection, wound breakdown, numbness, damage to nerves, tendon, muscles, arteries or other blood vessels, death and other possible complications from anesthesia. The patient was told that we will take steps to minimize these risks by using sterile technique, antibiotics and DVT prophylaxis when appropriate and follow the patient postoperatively in the office setting to monitor progress. The possibility of recurrent pain, no improvement in pain and actual worsening of pain were also discussed with the patient.\par The discharge plan of care focused on the patient going home following surgery. The patient was encouraged to make the necessary arrangements to have someone stay with them when they are discharged home. Following discharge, a home care nurse was to the patient. The home care nurse would open the patient´s home care case and request home physical therapy services. Home physical therapy was to commence following discharge provided it was appropriate and covered by the health insurance benefit plan. \par The benefits of surgery were discussed with the patient including the potential for improving her current clinical condition through operative intervention. Alternatives to surgical intervention including continued conservative management were also discussed in detail. All questions were answered to the satisfaction of the patient. The treatment plan of care, as well as a model of a total knee arthroplasty equivalent to the one that will be used for their total joint replacement, was shared with the patient. The patient agreed to the plan of care as well as the use of implants in their total joint replacement. \par

## 2022-11-04 NOTE — REVIEW OF SYSTEMS
[Joint Pain] : joint pain [Joint Stiffness] : joint stiffness [Negative] : Heme/Lymph [FreeTextEntry9] : Right hip and right knee

## 2022-11-04 NOTE — PHYSICAL EXAM
[Antalgic] : antalgic [LE] : Sensory: Intact in bilateral lower extremities [ALL] : dorsalis pedis, posterior tibial, femoral, popliteal, and radial 2+ and symmetric bilaterally [de-identified] : GENERAL APPEARANCE: Well nourished and hydrated, pleasant, alert, and oriented x 3. Appears their stated age. \par HEENT: Normocephalic, extraocular eye motion intact. Nasal septum midline. Oral cavity clear. External auditory canal clear. \par RESPIRATORY: Breath sounds clear and audible in all lobes. No wheezing, No accessory muscle use.\par CARDIOVASCULAR: No apparent abnormalities. No lower leg edema. No varicosities. Pedal pulses are palpable.\par NEUROLOGIC: Sensation is normal, no muscle weakness in the upper or lower extremities.\par DERMATOLOGIC: No apparent skin lesions, moist, warm, no rash.\par SPINE: Cervical spine appears normal and moves freely; thoracic spine appears normal and moves freely; lumbosacral spine appears normal and moves freely, normal, nontender.\par MUSCULOSKELETAL: Hands, wrists, and elbows are normal and move freely, shoulders are normal and move freely. \par Musculoskeletal\par 5/5 motor strength in bilateral lower extremities. Sensory: Intact in bilateral lower extremities. DTRs: Biceps, brachioradialis, triceps, patellar, ankle and plantar 2+ and symmetric bilaterally. Pulses: dorsalis pedis, posterior tibial, femoral, popliteal, and radial 2+ and symmetric bilaterally. \par Constitutional: Alert and in no acute distress, but well-appearing. \par  [de-identified] : Right hip examination shows no groin pain with straight leg raise range of motion is mildly limited without significant pain his right knee examination shows a severe varus alignment he has a 10 degree flexion contraction flexion is at 110 degree.  There is mild joint effusion .  [de-identified] : 3 views of the right hip x-ray shows mild osteoarthritis in the right hip evidenced by inferior femoral head and acetabulum area osteophyte formation\par \par 3 views of the right knee x-ray shows severe tricompartmental bone-on-bone osteoarthritis
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
## 2023-01-26 ENCOUNTER — OUTPATIENT (OUTPATIENT)
Dept: OUTPATIENT SERVICES | Facility: HOSPITAL | Age: 63
LOS: 1 days | End: 2023-01-26
Payer: COMMERCIAL

## 2023-01-26 VITALS
RESPIRATION RATE: 16 BRPM | HEART RATE: 67 BPM | WEIGHT: 268.96 LBS | DIASTOLIC BLOOD PRESSURE: 80 MMHG | OXYGEN SATURATION: 97 % | SYSTOLIC BLOOD PRESSURE: 115 MMHG | HEIGHT: 75 IN | TEMPERATURE: 97 F

## 2023-01-26 DIAGNOSIS — J45.909 UNSPECIFIED ASTHMA, UNCOMPLICATED: ICD-10-CM

## 2023-01-26 DIAGNOSIS — Z29.9 ENCOUNTER FOR PROPHYLACTIC MEASURES, UNSPECIFIED: ICD-10-CM

## 2023-01-26 DIAGNOSIS — G47.33 OBSTRUCTIVE SLEEP APNEA (ADULT) (PEDIATRIC): ICD-10-CM

## 2023-01-26 DIAGNOSIS — M17.11 UNILATERAL PRIMARY OSTEOARTHRITIS, RIGHT KNEE: ICD-10-CM

## 2023-01-26 DIAGNOSIS — Z98.890 OTHER SPECIFIED POSTPROCEDURAL STATES: Chronic | ICD-10-CM

## 2023-01-26 DIAGNOSIS — Z96.642 PRESENCE OF LEFT ARTIFICIAL HIP JOINT: Chronic | ICD-10-CM

## 2023-01-26 DIAGNOSIS — Z01.818 ENCOUNTER FOR OTHER PREPROCEDURAL EXAMINATION: ICD-10-CM

## 2023-01-26 LAB
A1C WITH ESTIMATED AVERAGE GLUCOSE RESULT: 5.9 % — HIGH (ref 4–5.6)
ANION GAP SERPL CALC-SCNC: 10 MMOL/L — SIGNIFICANT CHANGE UP (ref 5–17)
APTT BLD: 33.9 SEC — SIGNIFICANT CHANGE UP (ref 27.5–35.5)
BLD GP AB SCN SERPL QL: SIGNIFICANT CHANGE UP
BUN SERPL-MCNC: 24.5 MG/DL — HIGH (ref 8–20)
CALCIUM SERPL-MCNC: 9.3 MG/DL — SIGNIFICANT CHANGE UP (ref 8.4–10.5)
CHLORIDE SERPL-SCNC: 104 MMOL/L — SIGNIFICANT CHANGE UP (ref 96–108)
CO2 SERPL-SCNC: 25 MMOL/L — SIGNIFICANT CHANGE UP (ref 22–29)
CREAT SERPL-MCNC: 0.86 MG/DL — SIGNIFICANT CHANGE UP (ref 0.5–1.3)
EGFR: 97 ML/MIN/1.73M2 — SIGNIFICANT CHANGE UP
ESTIMATED AVERAGE GLUCOSE: 123 MG/DL — HIGH (ref 68–114)
GLUCOSE SERPL-MCNC: 99 MG/DL — SIGNIFICANT CHANGE UP (ref 70–99)
HCT VFR BLD CALC: 45.9 % — SIGNIFICANT CHANGE UP (ref 39–50)
HGB BLD-MCNC: 15.2 G/DL — SIGNIFICANT CHANGE UP (ref 13–17)
INR BLD: 1.03 RATIO — SIGNIFICANT CHANGE UP (ref 0.88–1.16)
MCHC RBC-ENTMCNC: 32.2 PG — SIGNIFICANT CHANGE UP (ref 27–34)
MCHC RBC-ENTMCNC: 33.1 GM/DL — SIGNIFICANT CHANGE UP (ref 32–36)
MCV RBC AUTO: 97.2 FL — SIGNIFICANT CHANGE UP (ref 80–100)
PLATELET # BLD AUTO: 169 K/UL — SIGNIFICANT CHANGE UP (ref 150–400)
POTASSIUM SERPL-MCNC: 5.1 MMOL/L — SIGNIFICANT CHANGE UP (ref 3.5–5.3)
POTASSIUM SERPL-SCNC: 5.1 MMOL/L — SIGNIFICANT CHANGE UP (ref 3.5–5.3)
PROTHROM AB SERPL-ACNC: 11.9 SEC — SIGNIFICANT CHANGE UP (ref 10.5–13.4)
RBC # BLD: 4.72 M/UL — SIGNIFICANT CHANGE UP (ref 4.2–5.8)
RBC # FLD: 12.2 % — SIGNIFICANT CHANGE UP (ref 10.3–14.5)
SODIUM SERPL-SCNC: 139 MMOL/L — SIGNIFICANT CHANGE UP (ref 135–145)
WBC # BLD: 6.32 K/UL — SIGNIFICANT CHANGE UP (ref 3.8–10.5)
WBC # FLD AUTO: 6.32 K/UL — SIGNIFICANT CHANGE UP (ref 3.8–10.5)

## 2023-01-26 PROCEDURE — G0463: CPT

## 2023-01-26 PROCEDURE — 93005 ELECTROCARDIOGRAM TRACING: CPT

## 2023-01-26 PROCEDURE — 93010 ELECTROCARDIOGRAM REPORT: CPT

## 2023-01-26 RX ORDER — BECLOMETHASONE DIPROPIONATE 40 UG/1
1 AEROSOL, METERED RESPIRATORY (INHALATION)
Qty: 0 | Refills: 0 | DISCHARGE

## 2023-01-26 NOTE — H&P PST ADULT - NSICDXPASTSURGICALHX_GEN_ALL_CORE_FT
PAST SURGICAL HISTORY:  H/O arthroscopy of knee Bilateral- 1976,2002,2012    History of back surgery 2014     PAST SURGICAL HISTORY:  H/O arthroscopy of knee Bilateral- 1976,2002,2012    History of back surgery 2014    History of left hip replacement

## 2023-01-26 NOTE — H&P PST ADULT - NSICDXPASTMEDICALHX_GEN_ALL_CORE_FT
PAST MEDICAL HISTORY:  Asthma     Skin cancer, basal cell      PAST MEDICAL HISTORY:  Asthma     Obstructive sleep apnea     Skin cancer, basal cell

## 2023-01-26 NOTE — H&P PST ADULT - HISTORY OF PRESENT ILLNESS
61 y/o male with PMH of asthma and basal cell carcinoma presents to PST with c/o Right knee pain 63 y/o male with PMH of asthma and basal cell carcinoma presents to PST with c/o Right knee pain, he reports pain for the last 10 years which progressively got worse, now has 8/10 pain with most of the weight bearing activity, no radiation, he had multiple steroid injections in the past, he also had physical therapy, he takes Tramadol for pain relief, now he is scheduled for a Right Total Knee Joint Replacement by Dr. Gomez on 2/14/23. Covid vaccine series completed, (Moderna X3) covid test is on 2/10/23. Medical and cardiac clearance pending

## 2023-01-26 NOTE — H&P PST ADULT - PROBLEM SELECTOR PLAN 4
Right Total Knee Joint Replacement by Dr. Gomez on 2/14/23. Covid vaccine series completed, (Moderna X3) covid test is on 2/10/23. Medical and cardiac clearance pending

## 2023-01-26 NOTE — H&P PST ADULT - ASSESSMENT
61 y/o male with PMH of asthma and basal cell carcinoma presents to PST with c/o Right knee pain, he reports pain for the last 10 years which progressively got worse, now has 8/10 pain with most of the weight bearing activity, no radiation, he had multiple steroid injections in the past, he also had physical therapy, he takes Tramadol for pain relief, now he is scheduled for a Right Total Knee Joint Replacement by Dr. Gomez on 23. Covid vaccine series completed, (Moderna X3) covid test is on 2/10/23. Medical and cardiac clearance pending   medications reviewed, instructions given on what medications to take and what not to take. He can take his Qvar inhaler the DOP, pt is not taking ASA/Plavix/Anticoagulation medication at this time. Asked the pt not to take any NSAID's 5-7 days before surgery and told the pt Tylenol is okay to take for pain, pt verbalized understanding. ERP teaching given and the pt verbalized understanding.   CAPRINI VTE 2.0 SCORE [CLOT updated 2019]    AGE RELATED RISK FACTORS                                                       MOBILITY RELATED FACTORS  [ ] Age 41-60 years                                            (1 Point)                    [ ] Bed rest                                                        (1 Point)  [ x] Age: 61-74 years                                           (2 Points)                  [ ] Plaster cast                                                   (2 Points)  [ ] Age= 75 years                                              (3 Points)                    [ ] Bed bound for more than 72 hours                 (2 Points)    DISEASE RELATED RISK FACTORS                                               GENDER SPECIFIC FACTORS  [x ] Edema in the lower extremities                       (1 Point)              [ ] Pregnancy                                                     (1 Point)  [ ] Varicose veins                                               (1 Point)                     [ ] Post-partum < 6 weeks                                   (1 Point)             [x ] BMI > 25 Kg/m2                                            (1 Point)                     [ ] Hormonal therapy  or oral contraception          (1 Point)                 [ ] Sepsis (in the previous month)                        (1 Point)               [ ] History of pregnancy complications                 (1 point)  [ ] Pneumonia or serious lung disease                                               [ ] Unexplained or recurrent                     (1 Point)           (in the previous month)                               (1 Point)  [ ] Abnormal pulmonary function test                     (1 Point)                 SURGERY RELATED RISK FACTORS  [ ] Acute myocardial infarction                              (1 Point)               [ ]  Section                                             (1 Point)  [ ] Congestive heart failure (in the previous month)  (1 Point)      [ ] Minor surgery                                                  (1 Point)   [ ] Inflammatory bowel disease                             (1 Point)               [ ] Arthroscopic surgery                                        (2 Points)  [ ] Central venous access                                      (2 Points)                [ ] General surgery lasting more than 45 minutes (2 points)  [ ] Malignancy- Present or previous                   (2 Points)                [x ] Elective arthroplasty                                         (5 points)    [ ] Stroke (in the previous month)                          (5 Points)                                                                                                                                                           HEMATOLOGY RELATED FACTORS                                                 TRAUMA RELATED RISK FACTORS  [ ] Prior episodes of VTE                                     (3 Points)                [ ] Fracture of the hip, pelvis, or leg                       (5 Points)  [ ] Positive family history for VTE                         (3 Points)             [ ] Acute spinal cord injury (in the previous month)  (5 Points)  [ ] Prothrombin 63635 A                                     (3 Points)               [ ] Paralysis  (less than 1 month)                             (5 Points)  [ ] Factor V Leiden                                             (3 Points)                  [ ] Multiple Trauma within 1 month                        (5 Points)  [ ] Lupus anticoagulants                                     (3 Points)                                                           [ ] Anticardiolipin antibodies                               (3 Points)                                                       [ ] High homocysteine in the blood                      (3 Points)                                             [ ] Other congenital or acquired thrombophilia      (3 Points)                                                [ ] Heparin induced thrombocytopenia                  (3 Points)                                     Total Score [   9       ]  OPIOID RISK TOOL    BERNY EACH BOX THAT APPLIES AND ADD TOTALS AT THE END    FAMILY HISTORY OF SUBSTANCE ABUSE                 FEMALE         MALE                                                Alcohol                             [  ]1 pt          [  ]3pts                                               Illegal Drugs                     [  ]2 pts        [  ]3pts                                               Rx Drugs                           [  ]4 pts        [  ]4 pts    PERSONAL HISTORY OF SUBSTANCE ABUSE                                                                                          Alcohol                             [  ]3 pts       [  ]3 pts                                               Illegal Drugs                     [  ]4 pts        [  ]4 pts                                               Rx Drugs                           [  ]5 pts        [  ]5 pts    AGE BETWEEN 16-45 YEARS                                      [  ]1 pt         [  ]1 pt    HISTORY OF PREADOLESCENT   SEXUAL ABUSE                                                             [  ]3 pts        [  ]0pts    PSYCHOLOGICAL DISEASE                     ADD, OCD, Bipolar, Schizophrenia        [  ]2 pts         [  ]2 pts                      Depression                                               [  ]1 pt           [  ]1 pt           SCORING TOTAL   (add numbers and type here)              ( 0)                                     A score of 3 or lower indicated LOW risk for future opioid abuse  A score of 4 to 7 indicated moderate risk for future opioid abuse  A score of 8 or higher indicates a high risk for opioid abuse

## 2023-01-26 NOTE — H&P PST ADULT - MUSCULOSKELETAL
normal/ROM intact/normal gait/strength 5/5 bilateral upper extremities/strength 5/5 bilateral lower extremities details… decreased ROM/decreased ROM due to pain/joint swelling/decreased strength/abnormal gait

## 2023-01-27 LAB
MRSA PCR RESULT.: SIGNIFICANT CHANGE UP
S AUREUS DNA NOSE QL NAA+PROBE: SIGNIFICANT CHANGE UP

## 2023-02-05 LAB — SARS-COV-2 N GENE NPH QL NAA+PROBE: NOT DETECTED

## 2023-02-08 ENCOUNTER — TRANSCRIPTION ENCOUNTER (OUTPATIENT)
Age: 63
End: 2023-02-08

## 2023-02-08 NOTE — ASU PATIENT PROFILE, ADULT - NSSUBSTANCEUSE_GEN_ALL_CORE_SD
Denies/never used/caffeine Dapsone Pregnancy And Lactation Text: This medication is Pregnancy Category C and is not considered safe during pregnancy or breast feeding.

## 2023-02-08 NOTE — ASU PATIENT PROFILE, ADULT - NSICDXPASTSURGICALHX_GEN_ALL_CORE_FT
PAST SURGICAL HISTORY:  H/O arthroscopy of knee Bilateral- 1976,2002,2012    History of back surgery 2014    History of left hip replacement

## 2023-02-09 ENCOUNTER — INPATIENT (INPATIENT)
Facility: HOSPITAL | Age: 63
LOS: 0 days | Discharge: HOME | DRG: 470 | End: 2023-02-10
Attending: ORTHOPAEDIC SURGERY | Admitting: ORTHOPAEDIC SURGERY
Payer: COMMERCIAL

## 2023-02-09 ENCOUNTER — APPOINTMENT (OUTPATIENT)
Dept: ORTHOPEDIC SURGERY | Facility: HOSPITAL | Age: 63
End: 2023-02-09

## 2023-02-09 ENCOUNTER — TRANSCRIPTION ENCOUNTER (OUTPATIENT)
Age: 63
End: 2023-02-09

## 2023-02-09 VITALS
DIASTOLIC BLOOD PRESSURE: 72 MMHG | HEART RATE: 60 BPM | SYSTOLIC BLOOD PRESSURE: 145 MMHG | WEIGHT: 268.96 LBS | TEMPERATURE: 97 F | RESPIRATION RATE: 16 BRPM | HEIGHT: 75 IN

## 2023-02-09 DIAGNOSIS — M17.11 UNILATERAL PRIMARY OSTEOARTHRITIS, RIGHT KNEE: ICD-10-CM

## 2023-02-09 DIAGNOSIS — Z98.890 OTHER SPECIFIED POSTPROCEDURAL STATES: Chronic | ICD-10-CM

## 2023-02-09 DIAGNOSIS — Z96.642 PRESENCE OF LEFT ARTIFICIAL HIP JOINT: Chronic | ICD-10-CM

## 2023-02-09 PROCEDURE — 27447 TOTAL KNEE ARTHROPLASTY: CPT | Mod: RT

## 2023-02-09 PROCEDURE — 20985 CPTR-ASST DIR MS PX: CPT | Mod: NC

## 2023-02-09 PROCEDURE — 27447 TOTAL KNEE ARTHROPLASTY: CPT | Mod: AS,RT

## 2023-02-09 PROCEDURE — 73560 X-RAY EXAM OF KNEE 1 OR 2: CPT | Mod: 26,RT

## 2023-02-09 DEVICE — ZIMMER FEMALE HEX SCREW MAGNETIC 2.5MM X 25MM: Type: IMPLANTABLE DEVICE | Site: RIGHT KNEE | Status: FUNCTIONAL

## 2023-02-09 DEVICE — SCREW CANC 4X30MM: Type: IMPLANTABLE DEVICE | Site: RIGHT KNEE | Status: FUNCTIONAL

## 2023-02-09 DEVICE — STEM TIB PSN SZ 5 DEG G R: Type: IMPLANTABLE DEVICE | Site: RIGHT KNEE | Status: FUNCTIONAL

## 2023-02-09 DEVICE — STEM EXT PERSONA 14MM PLUS 30M: Type: IMPLANTABLE DEVICE | Site: RIGHT KNEE | Status: FUNCTIONAL

## 2023-02-09 DEVICE — SURF ART PERSONA RT 10MM 10-12GH 10MM: Type: IMPLANTABLE DEVICE | Site: RIGHT KNEE | Status: FUNCTIONAL

## 2023-02-09 DEVICE — FEM PERSONA PS CMT CCR STD SZ 11 R: Type: IMPLANTABLE DEVICE | Site: RIGHT KNEE | Status: FUNCTIONAL

## 2023-02-09 DEVICE — ZIMMER/NEXGEN HEX HEAD SCREW 3.5MM: Type: IMPLANTABLE DEVICE | Site: RIGHT KNEE | Status: FUNCTIONAL

## 2023-02-09 DEVICE — CEMENT PALACOS R: Type: IMPLANTABLE DEVICE | Site: RIGHT KNEE | Status: FUNCTIONAL

## 2023-02-09 DEVICE — ZIMMER/NEXGEN SMOOTH PIN 3.2X75MM: Type: IMPLANTABLE DEVICE | Site: RIGHT KNEE | Status: FUNCTIONAL

## 2023-02-09 DEVICE — PATELLA PERSONA VE CEMENTED 35MM: Type: IMPLANTABLE DEVICE | Site: RIGHT KNEE | Status: FUNCTIONAL

## 2023-02-09 DEVICE — ORTHOALIGN THREADED PIN HEADED: Type: IMPLANTABLE DEVICE | Site: RIGHT KNEE | Status: FUNCTIONAL

## 2023-02-09 DEVICE — ATTUNE PINNING SYSTEM: Type: IMPLANTABLE DEVICE | Site: RIGHT KNEE | Status: FUNCTIONAL

## 2023-02-09 RX ORDER — SODIUM CHLORIDE 9 MG/ML
3 INJECTION INTRAMUSCULAR; INTRAVENOUS; SUBCUTANEOUS EVERY 8 HOURS
Refills: 0 | Status: CANCELLED | OUTPATIENT
Start: 2023-02-14 | End: 2023-02-09

## 2023-02-09 RX ORDER — BECLOMETHASONE DIPROPIONATE 40 UG/1
2 AEROSOL, METERED RESPIRATORY (INHALATION)
Qty: 0 | Refills: 0 | DISCHARGE

## 2023-02-09 RX ORDER — SODIUM CHLORIDE 9 MG/ML
1000 INJECTION INTRAMUSCULAR; INTRAVENOUS; SUBCUTANEOUS
Refills: 0 | Status: DISCONTINUED | OUTPATIENT
Start: 2023-02-10 | End: 2023-02-10

## 2023-02-09 RX ORDER — ACETAMINOPHEN 500 MG
975 TABLET ORAL
Refills: 0 | Status: COMPLETED | OUTPATIENT
Start: 2023-02-09 | End: 2023-02-10

## 2023-02-09 RX ORDER — SODIUM CHLORIDE 9 MG/ML
500 INJECTION INTRAMUSCULAR; INTRAVENOUS; SUBCUTANEOUS ONCE
Refills: 0 | Status: COMPLETED | OUTPATIENT
Start: 2023-02-09 | End: 2023-02-09

## 2023-02-09 RX ORDER — POLYETHYLENE GLYCOL 3350 17 G/17G
17 POWDER, FOR SOLUTION ORAL AT BEDTIME
Refills: 0 | Status: DISCONTINUED | OUTPATIENT
Start: 2023-02-09 | End: 2023-02-10

## 2023-02-09 RX ORDER — SENNA PLUS 8.6 MG/1
2 TABLET ORAL AT BEDTIME
Refills: 0 | Status: DISCONTINUED | OUTPATIENT
Start: 2023-02-09 | End: 2023-02-10

## 2023-02-09 RX ORDER — PANTOPRAZOLE SODIUM 20 MG/1
40 TABLET, DELAYED RELEASE ORAL
Refills: 0 | Status: DISCONTINUED | OUTPATIENT
Start: 2023-02-09 | End: 2023-02-10

## 2023-02-09 RX ORDER — HYDROMORPHONE HYDROCHLORIDE 2 MG/ML
0.5 INJECTION INTRAMUSCULAR; INTRAVENOUS; SUBCUTANEOUS
Refills: 0 | Status: DISCONTINUED | OUTPATIENT
Start: 2023-02-09 | End: 2023-02-09

## 2023-02-09 RX ORDER — HYDROMORPHONE HYDROCHLORIDE 2 MG/ML
4 INJECTION INTRAMUSCULAR; INTRAVENOUS; SUBCUTANEOUS
Refills: 0 | Status: DISCONTINUED | OUTPATIENT
Start: 2023-02-09 | End: 2023-02-10

## 2023-02-09 RX ORDER — TRANEXAMIC ACID 100 MG/ML
1000 INJECTION, SOLUTION INTRAVENOUS ONCE
Refills: 0 | Status: DISCONTINUED | OUTPATIENT
Start: 2023-02-09 | End: 2023-02-09

## 2023-02-09 RX ORDER — OXYCODONE HYDROCHLORIDE 5 MG/1
5 TABLET ORAL
Refills: 0 | Status: DISCONTINUED | OUTPATIENT
Start: 2023-02-09 | End: 2023-02-10

## 2023-02-09 RX ORDER — APREPITANT 80 MG/1
40 CAPSULE ORAL ONCE
Refills: 0 | Status: COMPLETED | OUTPATIENT
Start: 2023-02-09 | End: 2023-02-09

## 2023-02-09 RX ORDER — CELECOXIB 200 MG/1
200 CAPSULE ORAL EVERY 12 HOURS
Refills: 0 | Status: DISCONTINUED | OUTPATIENT
Start: 2023-02-11 | End: 2023-02-10

## 2023-02-09 RX ORDER — ASPIRIN/CALCIUM CARB/MAGNESIUM 324 MG
325 TABLET ORAL
Refills: 0 | Status: DISCONTINUED | OUTPATIENT
Start: 2023-02-09 | End: 2023-02-10

## 2023-02-09 RX ORDER — FENTANYL CITRATE 50 UG/ML
25 INJECTION INTRAVENOUS
Refills: 0 | Status: DISCONTINUED | OUTPATIENT
Start: 2023-02-09 | End: 2023-02-09

## 2023-02-09 RX ORDER — MOMETASONE FUROATE 220 UG/1
1 INHALANT RESPIRATORY (INHALATION)
Refills: 0 | Status: DISCONTINUED | OUTPATIENT
Start: 2023-02-09 | End: 2023-02-10

## 2023-02-09 RX ORDER — ONDANSETRON 8 MG/1
4 TABLET, FILM COATED ORAL ONCE
Refills: 0 | Status: COMPLETED | OUTPATIENT
Start: 2023-02-09 | End: 2023-02-09

## 2023-02-09 RX ORDER — CELECOXIB 200 MG/1
400 CAPSULE ORAL ONCE
Refills: 0 | Status: COMPLETED | OUTPATIENT
Start: 2023-02-09 | End: 2023-02-09

## 2023-02-09 RX ORDER — SODIUM CHLORIDE 9 MG/ML
1000 INJECTION, SOLUTION INTRAVENOUS
Refills: 0 | Status: DISCONTINUED | OUTPATIENT
Start: 2023-02-09 | End: 2023-02-09

## 2023-02-09 RX ORDER — OXYCODONE HYDROCHLORIDE 5 MG/1
10 TABLET ORAL
Refills: 0 | Status: DISCONTINUED | OUTPATIENT
Start: 2023-02-09 | End: 2023-02-10

## 2023-02-09 RX ORDER — CEFAZOLIN SODIUM 1 G
2000 VIAL (EA) INJECTION
Refills: 0 | Status: COMPLETED | OUTPATIENT
Start: 2023-02-09 | End: 2023-02-10

## 2023-02-09 RX ORDER — ONDANSETRON 8 MG/1
4 TABLET, FILM COATED ORAL EVERY 6 HOURS
Refills: 0 | Status: DISCONTINUED | OUTPATIENT
Start: 2023-02-09 | End: 2023-02-10

## 2023-02-09 RX ORDER — ACETAMINOPHEN 500 MG
1000 TABLET ORAL ONCE
Refills: 0 | Status: DISCONTINUED | OUTPATIENT
Start: 2023-02-09 | End: 2023-02-10

## 2023-02-09 RX ORDER — CEFAZOLIN SODIUM 1 G
3000 VIAL (EA) INJECTION ONCE
Refills: 0 | Status: DISCONTINUED | OUTPATIENT
Start: 2023-02-09 | End: 2023-02-09

## 2023-02-09 RX ORDER — MAGNESIUM HYDROXIDE 400 MG/1
30 TABLET, CHEWABLE ORAL DAILY
Refills: 0 | Status: DISCONTINUED | OUTPATIENT
Start: 2023-02-09 | End: 2023-02-10

## 2023-02-09 RX ORDER — ACETAMINOPHEN 500 MG
975 TABLET ORAL ONCE
Refills: 0 | Status: COMPLETED | OUTPATIENT
Start: 2023-02-09 | End: 2023-02-09

## 2023-02-09 RX ORDER — KETOROLAC TROMETHAMINE 30 MG/ML
15 SYRINGE (ML) INJECTION EVERY 6 HOURS
Refills: 0 | Status: COMPLETED | OUTPATIENT
Start: 2023-02-09 | End: 2023-02-10

## 2023-02-09 RX ADMIN — SODIUM CHLORIDE 1000 MILLILITER(S): 9 INJECTION INTRAMUSCULAR; INTRAVENOUS; SUBCUTANEOUS at 17:40

## 2023-02-09 RX ADMIN — Medication 975 MILLIGRAM(S): at 12:05

## 2023-02-09 RX ADMIN — Medication 975 MILLIGRAM(S): at 22:07

## 2023-02-09 RX ADMIN — CELECOXIB 400 MILLIGRAM(S): 200 CAPSULE ORAL at 12:06

## 2023-02-09 RX ADMIN — Medication 15 MILLIGRAM(S): at 22:06

## 2023-02-09 RX ADMIN — Medication 325 MILLIGRAM(S): at 22:07

## 2023-02-09 RX ADMIN — ONDANSETRON 4 MILLIGRAM(S): 8 TABLET, FILM COATED ORAL at 17:40

## 2023-02-09 RX ADMIN — Medication 2000 MILLIGRAM(S): at 22:06

## 2023-02-09 RX ADMIN — APREPITANT 40 MILLIGRAM(S): 80 CAPSULE ORAL at 12:05

## 2023-02-09 RX ADMIN — Medication 975 MILLIGRAM(S): at 23:46

## 2023-02-09 RX ADMIN — Medication 15 MILLIGRAM(S): at 23:47

## 2023-02-09 NOTE — DISCHARGE NOTE PROVIDER - CARE PROVIDERS DIRECT ADDRESSES
[email or web address]

## 2023-02-09 NOTE — DISCHARGE NOTE PROVIDER - NSDCMRMEDTOKEN_GEN_ALL_CORE_FT
Qvar 40 mcg/inh inhalation aerosol: 2 puff(s) inhaled 2 times a day  traMADol 50 mg oral tablet: 1 tab(s) orally every 6 hours, As Needed   aspirin 325 mg oral delayed release tablet: 1 tab(s) orally 2 times a day  celecoxib 200 mg oral capsule: 1 cap(s) orally every 12 hours  oxyCODONE 5 mg oral tablet: 1 tab(s) orally every 6 hours, As Needed - Pain MDD:4  pantoprazole 40 mg oral delayed release tablet: 1 tab(s) orally once a day (before a meal)  Qvar 40 mcg/inh inhalation aerosol: 2 puff(s) inhaled 2 times a day  Senna S 50 mg-8.6 mg oral tablet: 2 tab(s) orally once a day (at bedtime)

## 2023-02-09 NOTE — DISCHARGE NOTE PROVIDER - NSDCFUSCHEDAPPT_GEN_ALL_CORE_FT
Pipe Gomez  [facility]  ORTHOSURG [address]  Scheduled Appointment: 03/01/2023    Elebiary, Yeon J  [facility]  ORTHOSURG [address]  Scheduled Appointment: 04/04/2023    Pipe Gomez  [facility]  ORTHOSURG [address]  Scheduled Appointment: 05/03/2023

## 2023-02-09 NOTE — DISCHARGE NOTE PROVIDER - HOSPITAL COURSE
The patient underwent a RIGHT TOTAL KNEE REPLACEMENT on 2/9/23. The patient received antibiotics consistent with SCIP guidelines. The patient underwent the procedure and had no intra-operative complications. Post-operatively, the patient was seen by medicine and PT. The patient received ASA for clot prevention. The patient received pain medications per orthopedic pain management pathway and the pain was appropriately controlled. The patient did not have any post-operative medical complications. The patient was discharged in stable condition.

## 2023-02-09 NOTE — DISCHARGE NOTE PROVIDER - NSDCCPTREATMENT_GEN_ALL_CORE_FT
PRINCIPAL PROCEDURE  Procedure: TKA (total knee arthroplasty)  Findings and Treatment: right TKA

## 2023-02-09 NOTE — PROGRESS NOTE ADULT - SUBJECTIVE AND OBJECTIVE BOX
Ortho Post Op Check    Name: SEBASTIEN HAYS  MR #: [id number]    Procedure: right total knee arthroplasty   Surgeon: Nett    Pt comfortable without complaints, pain controlled  Denies CP, SOB, N/V, numbness/tingling     General Exam:  Vital Signs Last 24 Hrs  T(C): 36.1 (02-09-23 @ 17:35), Max: 36.1 (02-09-23 @ 17:35)  T(F): 97 (02-09-23 @ 17:35), Max: 97 (02-09-23 @ 17:35)  HR: 53 (02-09-23 @ 18:00) (53 - 63)  BP: 115/63 (02-09-23 @ 18:00) (90/55 - 115/63)  BP(mean): 76 (02-09-23 @ 18:00) (58 - 76)  RR: 15 (02-09-23 @ 18:00) (14 - 17)  SpO2: 97% (02-09-23 @ 18:00) (97% - 100%)    General: Pt Alert and oriented, NAD, controlled pain.  Dressings C/D/I. No bleeding.  Pulses: 2+ dorsalis pedis pulse. Cap refill < 2 sec.  Sensation: Grossly intact to light touch without deficit.  Motor: + EHL/FHL/TA/GS        A/P: 62yMale POD#0 s/p right total knee arthroplasty     - Pain Control  - DVT ppx:  BID  - Post op abx: as per SCIP  - PT eval pending  - Weight bearing status: WBAT

## 2023-02-09 NOTE — PHYSICAL THERAPY INITIAL EVALUATION ADULT - ADDITIONAL COMMENTS
Pt reports living in private home with wife who is able to assist. Pt has 2-3 SALVATORE and flight of 12-13 stairs inside with handrail. Independent prior. Owns cane, RW

## 2023-02-09 NOTE — DISCHARGE NOTE PROVIDER - NSDCFUADDINST_GEN_ALL_CORE_FT
The patient will be seen in the office between 2-3 weeks for wound check.   **Your first post-operative visit has been scheduled prior to your admission. PLEASE CONTACT OFFICE TO CONFIRM THE APPOINTMENT DATE. Tape will be removed at that time.  **  The silver based dressing is to be removed 7 days from the date of surgery.   ** CONTACT THE OFFICE IF THE FOLLOWING DEVELOP:  - the dressing becomes soiled or saturated  - you develop a fever greater that 101F  - the wound becomes red or you develop blistering around the wound  * Patient may shower after post-op day #3.   * The patient will continue home PT consistent with  total knee replacement protocol. Transition to outpatient PT will occur at the time of the first office visit.   * The patient will practice knee extension exercises regularly to minimize hamstring contraction.   * The patient is FULL weight bearing.  * The patient will continue ASPIRIN for 6 weeks after surgery for blood clot prevention.  * While on aspirin, the patient will take daily omeprazole or other similar medication to protect the stomach from irritation.   * The patient will take OXYCODONE AND TYLENOL for pain control and adjust according to prescription and patient needs. Contact the office if pain increases while taking prescribed pain medications or related concerns develop.  * Celebrex will be taken twice daily for 3 weeks for pain control and prevention of excessive bone growth. Additional prescription may be requested at your office follow-up visit.   * The patient will take Senna S while taking oxycodone to prevent narcotic associated constipation.  Additionally, increase water intake (drink at least 8 glasses of water daily) and try adding fiber to the diet by eating fruits, vegetables and foods that are rich in grains. If constipation is experienced, contact the medical/primary care provider to discuss further treatment options.  * To avoid injury at home:  - continue use of rolling walker until cleared by physical therapist  - have family or friend remove all throw rug or objects in hallways that may present a trip hazard.  - if you experience any dizziness or medical concerns, call your medical doctor or  911.  * The implant may activate metal detection devices.

## 2023-02-09 NOTE — DISCHARGE NOTE PROVIDER - CARE PROVIDER_API CALL
Pipe Gomez)  Orthopaedic Surgery  [address] [place] [address]  Phone: [phone]  Fax: [phone]  Follow Up Time:

## 2023-02-09 NOTE — DISCHARGE NOTE PROVIDER - NSDCCPCAREPLAN_GEN_ALL_CORE_FT
PRINCIPAL DISCHARGE DIAGNOSIS  Diagnosis: Arthritis of knee, right  Assessment and Plan of Treatment:

## 2023-02-09 NOTE — PHYSICAL THERAPY INITIAL EVALUATION ADULT - GENERAL OBSERVATIONS, REHAB EVAL
Pt received in bed, + IV, +b/l VCBs, breathing on RA in NAD, in 0/10 pain, agreeable to PT evaluation

## 2023-02-10 ENCOUNTER — TRANSCRIPTION ENCOUNTER (OUTPATIENT)
Age: 63
End: 2023-02-10

## 2023-02-10 VITALS
TEMPERATURE: 98 F | SYSTOLIC BLOOD PRESSURE: 142 MMHG | DIASTOLIC BLOOD PRESSURE: 83 MMHG | HEART RATE: 63 BPM | OXYGEN SATURATION: 95 % | RESPIRATION RATE: 18 BRPM

## 2023-02-10 PROCEDURE — 73560 X-RAY EXAM OF KNEE 1 OR 2: CPT

## 2023-02-10 PROCEDURE — 36415 COLL VENOUS BLD VENIPUNCTURE: CPT

## 2023-02-10 PROCEDURE — C1713: CPT

## 2023-02-10 PROCEDURE — 99222 1ST HOSP IP/OBS MODERATE 55: CPT

## 2023-02-10 PROCEDURE — C1776: CPT

## 2023-02-10 RX ORDER — ASPIRIN/CALCIUM CARB/MAGNESIUM 324 MG
1 TABLET ORAL
Qty: 84 | Refills: 0
Start: 2023-02-10

## 2023-02-10 RX ORDER — SENNOSIDES/DOCUSATE SODIUM 8.6MG-50MG
2 TABLET ORAL
Qty: 30 | Refills: 0
Start: 2023-02-10

## 2023-02-10 RX ORDER — OXYCODONE HYDROCHLORIDE 5 MG/1
1 TABLET ORAL
Qty: 28 | Refills: 0
Start: 2023-02-10

## 2023-02-10 RX ORDER — TRAMADOL HYDROCHLORIDE 50 MG/1
1 TABLET ORAL
Qty: 0 | Refills: 0 | DISCHARGE

## 2023-02-10 RX ORDER — PANTOPRAZOLE SODIUM 20 MG/1
1 TABLET, DELAYED RELEASE ORAL
Qty: 30 | Refills: 2
Start: 2023-02-10

## 2023-02-10 RX ORDER — CELECOXIB 200 MG/1
1 CAPSULE ORAL
Qty: 42 | Refills: 0
Start: 2023-02-10

## 2023-02-10 RX ADMIN — Medication 15 MILLIGRAM(S): at 05:32

## 2023-02-10 RX ADMIN — Medication 325 MILLIGRAM(S): at 05:32

## 2023-02-10 RX ADMIN — Medication 2000 MILLIGRAM(S): at 05:31

## 2023-02-10 RX ADMIN — Medication 975 MILLIGRAM(S): at 05:32

## 2023-02-10 RX ADMIN — OXYCODONE HYDROCHLORIDE 10 MILLIGRAM(S): 5 TABLET ORAL at 11:09

## 2023-02-10 RX ADMIN — PANTOPRAZOLE SODIUM 40 MILLIGRAM(S): 20 TABLET, DELAYED RELEASE ORAL at 05:31

## 2023-02-10 RX ADMIN — OXYCODONE HYDROCHLORIDE 10 MILLIGRAM(S): 5 TABLET ORAL at 10:09

## 2023-02-10 RX ADMIN — Medication 15 MILLIGRAM(S): at 07:01

## 2023-02-10 RX ADMIN — Medication 975 MILLIGRAM(S): at 07:01

## 2023-02-10 NOTE — CONSULT NOTE ADULT - ASSESSMENT
63 y/o male with PMH of asthma (compliant with ---, well-controlled), previous KJ (resolved with weight loss), and basal cell carcinoma (excised 2-3 years ago, no complications) presents with c/o Right knee pain. He reports pain for the last 10 years which progressively got worse, with minimal relief with Tramadol at home. He now has 8/10 pain with most of the weight bearing activity, no radiation, he had multiple steroid injections in the past, he also had physical therapy. He has had multiple knee surgeries, back and hip surgeries - all without complications. Pt is currently s/p R knee Replacement by Dr. Gomez on 2/14/23.     Right knee OA  S/p Right TKA POD#1.  Pain control.  PT/OT.  Antibiotics, wound care, DVT prophylaxis as per Ortho.  Incentive spirometry.  Avoid opioid induced constipation.    Asthma  Continue home medications.     Prediabetes  A1c 5.9.  Lifestyle modifications recommended.    DVT prophylaxis  ASA as per Ortho.

## 2023-02-10 NOTE — PATIENT PROFILE ADULT - FUNCTIONAL SCREEN CURRENT LEVEL: SWALLOWING (IF SCORE 2 OR MORE FOR ANY ITEM, CONSULT REHAB SERVICES), MLM)
3 mo presents with coughing no fever and congestion for a few weeks. Eating has decreased slightly but sleeping well.
0 = swallows foods/liquids without difficulty

## 2023-02-10 NOTE — PATIENT PROFILE ADULT - FALL HARM RISK - FACTORS
[de-identified] : s/p right hip intramedullary nail. 01/27/2020 [de-identified] : 68 year old female s/p right hip IMN on 1/27/20. She has been doing well at home. She is using a walker for ambulation and is starting to train with a cane. She ahs some pain and discomfort over the surgical sites but it has been improving. Patient denies any other injuries at this time.Patient denies fevers/chills, denies numbness/tingling/paresthesias, denies chest pain/shortness of breath, denies nausea/vomiting.\par  [de-identified] : RLE\par incisions are healing well, no erythema, no fluctuance, no areas of dehiscence\par no bony TTP\par +EHL/FHL/TA/GS\par SILT L2-S1\par brisk cap refill in toes\par compartments soft\par no calf TTP\par  [de-identified] : Xrays obtained today in the office of the right hip 2 views demonstrate intact hardware, adequate alignment of the fracture\par  [de-identified] : 68 year old female s/p right hip IMN on 1/27/20 [de-identified] : She will continue WBAT with assistive devices as needed. A PT prescription was given today. She will follow up in 4-6 weeks for repeat imaging and evaluation. All questions answered, patient understands and agrees with above plan.\par \par Randy Beaver MD\par Orthopaedic Trauma Surgeon\par [facility] [place]\par Director Orthopaedic Trauma, [facility]\par \par \par \par \par  Post Op

## 2023-02-10 NOTE — CONSULT NOTE ADULT - SUBJECTIVE AND OBJECTIVE BOX
CC: Right knee pain    HPI:  63 y/o male with PMH of asthma (compliant with ---, well-controlled), previous KJ (resolved with weight loss), and basal cell carcinoma (excised 2-3 years ago, no complications) presents with c/o Right knee pain. He reports pain for the last 10 years which progressively got worse, with minimal relief with Tramadol at home. He now has 8/10 pain with most of the weight bearing activity, no radiation, he had multiple steroid injections in the past, he also had physical therapy. He has had multiple knee surgeries, back and hip surgeries - all without complications. Pt is currently s/p R knee Replacement by Dr. Gomez on 23.     PAST MEDICAL & SURGICAL HISTORY:  Asthma      Skin cancer, basal cell      Obstructive sleep apnea (now resolved)      History of back surgery        H/O arthroscopy of knee  Bilateral- ,,      History of left hip replacement    REVIEW OF SYSTEMS:    CONSTITUTIONAL: No fever, weight loss, or fatigue  EYES: No eye pain, visual disturbances, or discharge  ENMT:  No difficulty hearing, tinnitus, vertigo; No sinus or throat pain  NECK: No pain or stiffness  RESPIRATORY: No cough, wheezing, chills or hemoptysis; No shortness of breath  CARDIOVASCULAR: No chest pain, palpitations, dizziness, or leg swelling  GASTROINTESTINAL: No abdominal or epigastric pain. No nausea, vomiting, or hematemesis; No diarrhea or constipation. No melena or hematochezia.  GENITOURINARY: No dysuria, frequency, hematuria, or incontinence  NEUROLOGICAL: No headaches, memory loss, loss of strength, numbness, or tremors  SKIN: No itching, burning, rashes, or lesions   ENDOCRINE: No heat or cold intolerance; No hair loss  MUSCULOSKELETAL: R knee pain; No muscle, back, or extremity pain  PSYCHIATRIC: No depression, anxiety, mood swings, or difficulty sleeping          MEDICATIONS  (STANDING):  acetaminophen   IVPB .. 1000 milliGRAM(s) IV Intermittent once  aspirin enteric coated 325 milliGRAM(s) Oral two times a day  ketorolac   Injectable 15 milliGRAM(s) IV Push every 6 hours  mometasone 220 MICROgram(s) Inhaler 1 Puff(s) Inhalation two times a day  pantoprazole    Tablet 40 milliGRAM(s) Oral before breakfast  polyethylene glycol 3350 17 Gram(s) Oral at bedtime  senna 2 Tablet(s) Oral at bedtime  sodium chloride 0.9%. 1000 milliLiter(s) (125 mL/Hr) IV Continuous <Continuous>    MEDICATIONS  (PRN):  HYDROmorphone   Tablet 4 milliGRAM(s) Oral every 3 hours PRN Severe Pain (7 - 10)  magnesium hydroxide Suspension 30 milliLiter(s) Oral daily PRN Constipation  ondansetron Injectable 4 milliGRAM(s) IV Push every 6 hours PRN Nausea and/or Vomiting  oxyCODONE    IR 5 milliGRAM(s) Oral every 3 hours PRN Mild Pain (1 - 3)  oxyCODONE    IR 10 milliGRAM(s) Oral every 3 hours PRN Moderate Pain (4 - 6)      Allergies    No Known Allergies    Intolerances        SOCIAL HISTORY:  ex-smoker 1PPD x 30 years, quit ~15 years ago  social drinker  deniesrecreational/illicit drug use    FAMILY HISTORY:  FHx: lung cancer (Mother,  in her 40s)        Vital Signs Last 24 Hrs  T(C): 36.4 (10 Feb 2023 05:23), Max: 36.4 (10 Feb 2023 05:23)  T(F): 97.5 (10 Feb 2023 05:23), Max: 97.5 (10 Feb 2023 05:23)  HR: 63 (10 Feb 2023 05:23) (53 - 63)  BP: 142/83 (10 Feb 2023 05:23) (90/55 - 155/75)  BP(mean): 78 (2023 18:30) (58 - 78)  RR: 18 (10 Feb 2023 05:23) (14 - 18)  SpO2: 95% (10 Feb 2023 05:23) (95% - 100%)    Parameters below as of 10 Feb 2023 05:23  Patient On (Oxygen Delivery Method): room air      PHYSICAL EXAM:  GENERAL: NAD  HEAD:  Atraumatic, Normocephalic  EYES: EOMI, PERRLA, conjunctiva and sclera clear  NECK: Supple, No JVD, Normal thyroid  NERVOUS SYSTEM:  Alert & Oriented X3, Good concentration; Motor Strength 5/5 B/L upper and lower extremities  CHEST/LUNG: Clear to auscultation bilaterally; No rales, rhonchi, wheezing, or rubs  HEART: Regular rate and rhythm; No murmurs, rubs, or gallops  ABDOMEN: Soft, Nontender, Nondistended; Bowel sounds present  EXTREMITIES:  2+ Peripheral Pulses, No clubbing, cyanosis, or edema; Right knee with clean dressing  SKIN: No rashes or lesions        RADIOLOGY & ADDITIONAL STUDIES:  < from: Xray Knee 1 or 2 Views, Right (23 @ 17:40) >  ACC: [id number] EXAM:  XR KNEE 1-2 VIEWS RT   ORDERED BY: YEON J ELEBIARY     PROCEDURE DATE:  2023          INTERPRETATION:  An AP and cross table lateral portable radiograph of the   right knee was performed for arthroplasty hardware placement status.    Comparison is made to a preoperative exam of 2022.    The patient is undergone right knee arthroplasty, with good anatomic   alignment and no sign of hardware loosening, breakage or displacement.   There are typical immediate postprocedure findings in and around the   joint spaces, including air, fluid and edema.    IMPRESSION:  1. Successful positioning of right hip arthroplastic device.  2. Typical expected findings in and around the joint space in the   immediate postoperative period, with no sign of immediate postprocedure   complication.    --- End of Report ---            ABBE WELLS MD; Attending Radiologist  This document has been electronically signed. 2023  5:45PM    < end of copied text >

## 2023-02-10 NOTE — DISCHARGE NOTE NURSING/CASE MANAGEMENT/SOCIAL WORK - PATIENT PORTAL LINK FT
You can access the FollowMyHealth Patient Portal offered by [facility] by registering at the following website: [email or web address]. By joining [facility]’s FollowMyHealth portal, you will also be able to view your health information using other applications (apps) compatible with our system.

## 2023-02-10 NOTE — PROGRESS NOTE ADULT - SUBJECTIVE AND OBJECTIVE BOX
SEBASTIEN HAYS    [id number]    History: 62y Male is status post right total knee arthroplasty on 2/9/2023, POD # 1. Patient is doing well and is comfortable. The patient's pain is controlled using the prescribed pain medications. The patient is participating in physical therapy. Denies nausea, vomiting, chest pain, shortness of breath, abdominal pain or fever. No new complaints.          Physical exam: The right knee dressing is clean, dry and intact. No drainage or discharge. No erythema is noted. No blistering. No ecchymosis. The calf is supple nontender.  No calf tenderness. Sensation to light touch is grossly intact distally. Motor function distally is 5/5. Extensor hallucis longus and flexor hallucis longus are intact. No foot drop. 2+ dorsalis pedis pulse. Capillary refill is less than 2 seconds. No cyanosis.    Primary Orthopedic Assessment:  • s/p RIGHT total knee replacement pod #1    Plan:   • DVT prophylaxis aspirin, including use of compression devices and ankle pumps  • Continue physical therapy  • Weightbearing as tolerated of the right lower extremity with assistance of a walker  • Incentive spirometry encouraged  • Pain control as clinically indicated  • Discharge planning – anticipated discharge is Home

## 2023-02-10 NOTE — CONSULT NOTE ADULT - NS ATTEND AMEND GEN_ALL_CORE FT
Patient seen and examined , s/p R TKA , POD # 1.Pain well controlled ,   denies n/v , voiding , tolerating diet   Vital Signs Last 24 Hrs  T(C): 36.4 (02-10-23 @ 05:23), Max: 36.4 (02-10-23 @ 05:23)  T(F): 97.5 (02-10-23 @ 05:23), Max: 97.5 (02-10-23 @ 05:23)  HR: 63 (02-10-23 @ 05:23) (53 - 63)  BP: 142/83 (02-10-23 @ 05:23) (90/55 - 155/75)  BP(mean): 78 (02-09-23 @ 18:30) (58 - 78)  RR: 18 (02-10-23 @ 05:23) (14 - 18)  SpO2: 95% (02-10-23 @ 05:23) (95% - 100%)    Lungs; CTA B/L   CVS: S1S2 no rubs , no murmurs   R knee dressing + , clean and dry   Above noted and reviewed with NP .   Dispo plan is home - likely today .     Thank you for the courtesy of this consult.    Medically stable to d/c once cleared by physical therapy/ ortho .

## 2023-03-01 ENCOUNTER — APPOINTMENT (OUTPATIENT)
Dept: ORTHOPEDIC SURGERY | Facility: CLINIC | Age: 63
End: 2023-03-01
Payer: COMMERCIAL

## 2023-03-01 VITALS
SYSTOLIC BLOOD PRESSURE: 144 MMHG | DIASTOLIC BLOOD PRESSURE: 79 MMHG | HEART RATE: 69 BPM | WEIGHT: 260 LBS | HEIGHT: 75 IN | BODY MASS INDEX: 32.33 KG/M2

## 2023-03-01 PROCEDURE — 99024 POSTOP FOLLOW-UP VISIT: CPT

## 2023-03-01 PROCEDURE — 73562 X-RAY EXAM OF KNEE 3: CPT | Mod: RT

## 2023-03-01 RX ORDER — ASPIRIN 325 MG/1
325 TABLET, FILM COATED ORAL TWICE DAILY
Qty: 42 | Refills: 0 | Status: ACTIVE | COMMUNITY
Start: 2023-03-01 | End: 1900-01-01

## 2023-03-01 NOTE — HISTORY OF PRESENT ILLNESS
[Clean/Dry/Intact] : clean, dry and intact [Healed] : healed [Swelling] : swollen [Neuro Intact] : an unremarkable neurological exam [Vascular Intact] : ~T peripheral vascular exam normal [Negative Maya's] : maneuvers demonstrated a negative Maya's sign [Xray (Date:___)] : [unfilled] Xray -  [3] : the patient reports pain that is 3/10 in severity [Doing Well] : is doing well [No Sign of Infection] : is showing no signs of infection [Adequate Pain Control] : has adequate pain control [Chills] : no chills [Constipation] : no constipation [Diarrhea] : no diarrhea [Dysuria] : no dysuria [Fever] : no fever [Nausea] : no nausea [Vomiting] : no vomiting [Erythema] : not erythematous [Discharge] : absent of discharge [Dehiscence] : not dehisced [de-identified] : S/P Right total knee arthroplasty.\par  Computerassisted tibial resection, [employer].\par DOS 02/09/2023\par  [de-identified] : 63 y/o M pt presents 2.5 weeks post op from right TKA. He is using oxycodone for pain 5mg 3-4 times per  day, Pt is taking aspirin .\par pt has 4/10 pain but 7/10 in calf and thigh. OVerall pt is doing well. He ambulates with a single point cane. \par \par  [de-identified] : Right knee exam shows healing incision with no sign of infection. ROM 5-80  [de-identified] : 3V xray of the right knee done in office today and reviewed by Dr. Pipe Gomez demonstrates s/p right knee implants in good positioning with no evidence of wear, loosening, or subsidence.  [de-identified] : He should continue to do low impact exercises. Pt understands the importance of prophylaxis for invasive dental procedures. Pt should continue taking aspirin BID for DVTP. F/u with us in 3 weeks.

## 2023-03-23 RX ORDER — OXYCODONE 5 MG/1
5 TABLET ORAL
Qty: 28 | Refills: 0 | Status: ACTIVE | COMMUNITY
Start: 2023-03-23 | End: 1900-01-01

## 2023-03-27 RX ORDER — OXYCODONE 5 MG/1
5 TABLET ORAL EVERY 8 HOURS
Qty: 20 | Refills: 0 | Status: ACTIVE | COMMUNITY
Start: 2023-02-15 | End: 1900-01-01

## 2023-04-04 ENCOUNTER — APPOINTMENT (OUTPATIENT)
Dept: ORTHOPEDIC SURGERY | Facility: CLINIC | Age: 63
End: 2023-04-04
Payer: COMMERCIAL

## 2023-04-04 VITALS
SYSTOLIC BLOOD PRESSURE: 137 MMHG | BODY MASS INDEX: 32.33 KG/M2 | HEART RATE: 70 BPM | HEIGHT: 75 IN | DIASTOLIC BLOOD PRESSURE: 72 MMHG | WEIGHT: 260 LBS

## 2023-04-04 PROCEDURE — 73562 X-RAY EXAM OF KNEE 3: CPT | Mod: RT

## 2023-04-04 PROCEDURE — 99024 POSTOP FOLLOW-UP VISIT: CPT

## 2023-04-04 RX ORDER — OXYCODONE 5 MG/1
5 TABLET ORAL
Qty: 20 | Refills: 0 | Status: ACTIVE | COMMUNITY
Start: 2023-03-13 | End: 1900-01-01

## 2023-04-04 NOTE — HISTORY OF PRESENT ILLNESS
[2] : the patient reports pain that is 2/10 in severity [Healed] : healed [Neuro Intact] : an unremarkable neurological exam [Vascular Intact] : ~T peripheral vascular exam normal [Negative Maya's] : maneuvers demonstrated a negative Maya's sign [Doing Well] : is doing well [No Sign of Infection] : is showing no signs of infection [Adequate Pain Control] : has adequate pain control [Chills] : no chills [Constipation] : no constipation [Diarrhea] : no diarrhea [Dysuria] : no dysuria [Fever] : no fever [Nausea] : no nausea [Vomiting] : no vomiting [Erythema] : not erythematous [Discharge] : absent of discharge [Swelling] : not swollen [Dehiscence] : not dehisced [de-identified] : S/P Right total knee arthroplasty.\par  Computer_assisted tibial resection, [facility].\par DOS 02/09/2023\par  [de-identified] : he is in PT\par he is back to work.\par pt is taking oxycodone once at night.  [de-identified] : Right knee exam shows healing incision with no sign of infection. ROM 0-110. The surgical incision site(s) swollen, but clean, dry and intact, healed, not erythematous, absent of discharge and not dehisced. Additional findings included an unremarkable neurological exam, peripheral vascular exam normal and maneuvers demonstrated a negative Maya's sign.  [de-identified] :  3V xray of the right knee done in office today and  demonstrates s/p right knee implants in good positioning with no evidence of wear, loosening, or subsidence. \par  [de-identified] : He should continue to do low impact exercises. Pt understands the importance of prophylaxis for invasive dental procedures. I advised pt to wean off from oxycodone asap. F/u with us in 3 weeks. \par  \par

## 2023-05-03 ENCOUNTER — APPOINTMENT (OUTPATIENT)
Dept: ORTHOPEDIC SURGERY | Facility: CLINIC | Age: 63
End: 2023-05-03
Payer: COMMERCIAL

## 2023-05-03 VITALS
HEIGHT: 75 IN | HEART RATE: 73 BPM | DIASTOLIC BLOOD PRESSURE: 68 MMHG | WEIGHT: 260 LBS | BODY MASS INDEX: 32.33 KG/M2 | SYSTOLIC BLOOD PRESSURE: 143 MMHG

## 2023-05-03 DIAGNOSIS — Z96.651 PRESENCE OF RIGHT ARTIFICIAL KNEE JOINT: ICD-10-CM

## 2023-05-03 DIAGNOSIS — Z96.651 AFTERCARE FOLLOWING JOINT REPLACEMENT SURGERY: ICD-10-CM

## 2023-05-03 DIAGNOSIS — Z47.1 AFTERCARE FOLLOWING JOINT REPLACEMENT SURGERY: ICD-10-CM

## 2023-05-03 PROCEDURE — 99024 POSTOP FOLLOW-UP VISIT: CPT

## 2023-05-03 PROCEDURE — 73562 X-RAY EXAM OF KNEE 3: CPT | Mod: RT

## 2023-05-03 NOTE — HISTORY OF PRESENT ILLNESS
[Clean/Dry/Intact] : clean, dry and intact [Healed] : healed [Swelling] : swollen [Neuro Intact] : an unremarkable neurological exam [Vascular Intact] : ~T peripheral vascular exam normal [Negative Maya's] : maneuvers demonstrated a negative Maya's sign [Xray (Date:___)] : [unfilled] Xray -  [0] : no pain reported [Doing Well] : is doing well [Excellent Pain Control] : has excellent pain control [No Sign of Infection] : is showing no signs of infection [Chills] : no chills [Constipation] : no constipation [Diarrhea] : no diarrhea [Dysuria] : no dysuria [Fever] : no fever [Nausea] : no nausea [Vomiting] : no vomiting [Erythema] : not erythematous [Discharge] : absent of discharge [Dehiscence] : not dehisced [de-identified] : S/P Right total knee arthroplasty.\par  Computer_assisted tibial resection, [facility].\par DOS 02/09/2023\par  [de-identified] : 61 y/o M pt presents 12 weeks post op from right TKA.  He denies any pain he is extremely happy with almost nonvisible scar in the knee function.  [de-identified] : Right knee exam shows healed incision with no sign of infection. ROM 0 to 110 degree [de-identified] : 3V xray of the right knee done in office today and reviewed by Dr. Pipe Gomez demonstrates s/p right knee implants in good positioning with no evidence of wear, loosening, or subsidence.  [de-identified] : He should continue to do low impact exercises. Pt understands the importance of prophylaxis for invasive dental procedures. F/u with us a year from surgery.

## 2023-10-02 PROBLEM — E78.5 HYPERLIPIDEMIA, UNSPECIFIED: Status: ACTIVE | Noted: 2023-10-02

## 2023-10-02 PROBLEM — I45.10 UNSPECIFIED RIGHT BUNDLE-BRANCH BLOCK: Status: ACTIVE | Noted: 2023-10-02

## 2023-10-02 PROBLEM — E66.9 OBESITY, UNSPECIFIED: Status: ACTIVE | Noted: 2023-10-02

## 2023-10-02 PROBLEM — R94.31 ABNORMAL ECG: Status: ACTIVE | Noted: 2023-10-02

## 2023-10-02 PROBLEM — Z92.89 H/O ECHOCARDIOGRAM: Status: RESOLVED | Noted: 2023-10-02 | Resolved: 2023-10-02

## 2023-10-02 PROBLEM — Z82.49 FAMILY HISTORY OF MYOCARDIAL INFARCTION: Status: ACTIVE | Noted: 2023-10-02

## 2023-10-02 PROBLEM — M19.90 ARTHRITIS: Status: ACTIVE | Noted: 2017-08-18

## 2023-10-02 PROBLEM — I65.29 OCCLUSION AND STENOSIS OF UNSPECIFIED CAROTID ARTERY: Status: ACTIVE | Noted: 2023-10-02

## 2023-10-02 PROBLEM — J45.909 ASTHMA, UNSPECIFIED ASTHMA SEVERITY, UNSPECIFIED WHETHER COMPLICATED, UNSPECIFIED WHETHER PERSISTENT: Status: ACTIVE | Noted: 2017-08-18

## 2023-10-02 RX ORDER — ROSUVASTATIN CALCIUM 10 MG/1
10 TABLET, FILM COATED ORAL
Refills: 0 | Status: ACTIVE | COMMUNITY

## 2023-10-03 ENCOUNTER — NON-APPOINTMENT (OUTPATIENT)
Age: 63
End: 2023-10-03

## 2023-10-03 DIAGNOSIS — E78.5 HYPERLIPIDEMIA, UNSPECIFIED: ICD-10-CM

## 2023-10-03 DIAGNOSIS — I45.10 UNSPECIFIED RIGHT BUNDLE-BRANCH BLOCK: ICD-10-CM

## 2023-10-03 DIAGNOSIS — Z92.89 PERSONAL HISTORY OF OTHER MEDICAL TREATMENT: ICD-10-CM

## 2023-10-03 DIAGNOSIS — R94.31 ABNORMAL ELECTROCARDIOGRAM [ECG] [EKG]: ICD-10-CM

## 2023-10-03 DIAGNOSIS — I65.29 OCCLUSION AND STENOSIS OF UNSPECIFIED CAROTID ARTERY: ICD-10-CM

## 2023-10-03 DIAGNOSIS — J45.909 UNSPECIFIED ASTHMA, UNCOMPLICATED: ICD-10-CM

## 2023-10-03 DIAGNOSIS — Z82.49 FAMILY HISTORY OF ISCHEMIC HEART DISEASE AND OTHER DISEASES OF THE CIRCULATORY SYSTEM: ICD-10-CM

## 2023-10-03 DIAGNOSIS — M19.90 UNSPECIFIED OSTEOARTHRITIS, UNSPECIFIED SITE: ICD-10-CM

## 2023-10-03 DIAGNOSIS — E66.9 OBESITY, UNSPECIFIED: ICD-10-CM

## 2024-01-02 NOTE — ASU PREOP CHECKLIST - NOTHING BY MOUTH SINCE
Rx Refill Note  Requested Prescriptions     Pending Prescriptions Disp Refills    simvastatin (ZOCOR) 20 MG tablet [Pharmacy Med Name: SIMVASTATIN 20 MG TABLET] 90 tablet 2     Sig: TAKE 1 TABLET BY MOUTH EVERY DAY    hydroCHLOROthiazide (HYDRODIURIL) 25 MG tablet [Pharmacy Med Name: HYDROCHLOROTHIAZIDE 25 MG TAB] 90 tablet 2     Sig: TAKE 1 TABLET BY MOUTH EVERY DAY      Last office visit with prescribing clinician: 1/16/2023   Last telemedicine visit with prescribing clinician: Visit date not found   Next office visit with prescribing clinician: 1/17/2024                         Would you like a call back once the refill request has been completed: [] Yes [] No    If the office needs to give you a call back, can they leave a voicemail: [] Yes [] No    Albin Rivera MA  01/02/24, 09:28 CST   11-Jan-2022 08:00

## 2024-12-31 PROBLEM — G47.33 OBSTRUCTIVE SLEEP APNEA (ADULT) (PEDIATRIC): Chronic | Status: ACTIVE | Noted: 2023-01-26

## 2025-01-27 ENCOUNTER — NON-APPOINTMENT (OUTPATIENT)
Age: 65
End: 2025-01-27

## 2025-01-28 ENCOUNTER — APPOINTMENT (OUTPATIENT)
Dept: CARDIOLOGY | Facility: CLINIC | Age: 65
End: 2025-01-28
Payer: COMMERCIAL

## 2025-01-28 VITALS
WEIGHT: 259 LBS | SYSTOLIC BLOOD PRESSURE: 132 MMHG | DIASTOLIC BLOOD PRESSURE: 78 MMHG | OXYGEN SATURATION: 99 % | HEIGHT: 75 IN | HEART RATE: 72 BPM | BODY MASS INDEX: 32.2 KG/M2

## 2025-01-28 DIAGNOSIS — R94.31 ABNORMAL ELECTROCARDIOGRAM [ECG] [EKG]: ICD-10-CM

## 2025-01-28 DIAGNOSIS — I65.29 OCCLUSION AND STENOSIS OF UNSPECIFIED CAROTID ARTERY: ICD-10-CM

## 2025-01-28 DIAGNOSIS — E78.5 HYPERLIPIDEMIA, UNSPECIFIED: ICD-10-CM

## 2025-01-28 DIAGNOSIS — I45.10 UNSPECIFIED RIGHT BUNDLE-BRANCH BLOCK: ICD-10-CM

## 2025-01-28 DIAGNOSIS — E66.9 OBESITY, UNSPECIFIED: ICD-10-CM

## 2025-01-28 PROCEDURE — 99204 OFFICE O/P NEW MOD 45 MIN: CPT | Mod: 25

## 2025-01-28 PROCEDURE — 93000 ELECTROCARDIOGRAM COMPLETE: CPT

## 2025-01-28 RX ORDER — ATORVASTATIN CALCIUM 20 MG/1
20 TABLET, FILM COATED ORAL
Qty: 90 | Refills: 0 | Status: ACTIVE | COMMUNITY
Start: 2025-01-28 | End: 1900-01-01

## 2025-01-28 RX ORDER — ASPIRIN ENTERIC COATED TABLETS 81 MG 81 MG/1
81 TABLET, DELAYED RELEASE ORAL DAILY
Qty: 90 | Refills: 3 | Status: ACTIVE | COMMUNITY
Start: 2025-01-28

## 2025-02-13 ENCOUNTER — APPOINTMENT (OUTPATIENT)
Dept: CARDIOLOGY | Facility: CLINIC | Age: 65
End: 2025-02-13
Payer: COMMERCIAL

## 2025-02-13 PROCEDURE — 93880 EXTRACRANIAL BILAT STUDY: CPT

## 2025-02-13 PROCEDURE — 93306 TTE W/DOPPLER COMPLETE: CPT

## 2025-03-03 ENCOUNTER — OUTPATIENT (OUTPATIENT)
Dept: OUTPATIENT SERVICES | Facility: HOSPITAL | Age: 65
LOS: 1 days | End: 2025-03-03
Payer: COMMERCIAL

## 2025-03-03 ENCOUNTER — APPOINTMENT (OUTPATIENT)
Dept: CT IMAGING | Facility: CLINIC | Age: 65
End: 2025-03-03
Payer: COMMERCIAL

## 2025-03-03 DIAGNOSIS — Z98.890 OTHER SPECIFIED POSTPROCEDURAL STATES: Chronic | ICD-10-CM

## 2025-03-03 DIAGNOSIS — Z96.642 PRESENCE OF LEFT ARTIFICIAL HIP JOINT: Chronic | ICD-10-CM

## 2025-03-03 DIAGNOSIS — R94.31 ABNORMAL ELECTROCARDIOGRAM [ECG] [EKG]: ICD-10-CM

## 2025-03-03 PROCEDURE — 75574 CT ANGIO HRT W/3D IMAGE: CPT

## 2025-03-03 PROCEDURE — 75574 CT ANGIO HRT W/3D IMAGE: CPT | Mod: 26

## 2025-03-04 ENCOUNTER — APPOINTMENT (OUTPATIENT)
Dept: CARDIOLOGY | Facility: CLINIC | Age: 65
End: 2025-03-04
Payer: COMMERCIAL

## 2025-03-04 VITALS
WEIGHT: 260 LBS | HEART RATE: 63 BPM | BODY MASS INDEX: 32.33 KG/M2 | DIASTOLIC BLOOD PRESSURE: 78 MMHG | SYSTOLIC BLOOD PRESSURE: 142 MMHG | HEIGHT: 75 IN | OXYGEN SATURATION: 98 %

## 2025-03-04 DIAGNOSIS — E78.5 HYPERLIPIDEMIA, UNSPECIFIED: ICD-10-CM

## 2025-03-04 DIAGNOSIS — I25.10 ATHEROSCLEROTIC HEART DISEASE OF NATIVE CORONARY ARTERY W/OUT ANGINA PECTORIS: ICD-10-CM

## 2025-03-04 DIAGNOSIS — R94.31 ABNORMAL ELECTROCARDIOGRAM [ECG] [EKG]: ICD-10-CM

## 2025-03-04 DIAGNOSIS — I45.10 UNSPECIFIED RIGHT BUNDLE-BRANCH BLOCK: ICD-10-CM

## 2025-03-04 PROCEDURE — 99214 OFFICE O/P EST MOD 30 MIN: CPT

## 2025-04-26 ENCOUNTER — RX RENEWAL (OUTPATIENT)
Age: 65
End: 2025-04-26

## 2025-09-09 ENCOUNTER — APPOINTMENT (OUTPATIENT)
Dept: CARDIOLOGY | Facility: CLINIC | Age: 65
End: 2025-09-09
Payer: COMMERCIAL

## 2025-09-09 VITALS
OXYGEN SATURATION: 97 % | HEIGHT: 75 IN | DIASTOLIC BLOOD PRESSURE: 76 MMHG | BODY MASS INDEX: 31.08 KG/M2 | WEIGHT: 250 LBS | HEART RATE: 47 BPM | SYSTOLIC BLOOD PRESSURE: 128 MMHG

## 2025-09-09 DIAGNOSIS — R94.31 ABNORMAL ELECTROCARDIOGRAM [ECG] [EKG]: ICD-10-CM

## 2025-09-09 DIAGNOSIS — I45.10 UNSPECIFIED RIGHT BUNDLE-BRANCH BLOCK: ICD-10-CM

## 2025-09-09 DIAGNOSIS — I25.10 ATHEROSCLEROTIC HEART DISEASE OF NATIVE CORONARY ARTERY W/OUT ANGINA PECTORIS: ICD-10-CM

## 2025-09-09 DIAGNOSIS — I65.29 OCCLUSION AND STENOSIS OF UNSPECIFIED CAROTID ARTERY: ICD-10-CM

## 2025-09-09 PROCEDURE — 93000 ELECTROCARDIOGRAM COMPLETE: CPT

## 2025-09-09 PROCEDURE — 99214 OFFICE O/P EST MOD 30 MIN: CPT | Mod: 25

## (undated) DEVICE — DRAPE 3/4 SHEET 52X76"

## (undated) DEVICE — SYR LUER LOK 50CC

## (undated) DEVICE — SOL IRR POUR H2O 1000ML

## (undated) DEVICE — DRAPE 1/2 SHEET 40X57"

## (undated) DEVICE — PACK TOTAL KNEE

## (undated) DEVICE — ZIMMER BLADE PATELLA REAMER W PILOT HOLE SZ 32

## (undated) DEVICE — SAW BLADE STRYKER SAGITTAL DUAL CUT 75X18X1.27MM

## (undated) DEVICE — DRAPE XL SHEET 77X98"

## (undated) DEVICE — HOOD FLYTE STRYKER SURGICOOL W PEELAWAY

## (undated) DEVICE — SOL BETADINE POUCH 0.75OZ STERILE

## (undated) DEVICE — VENODYNE/SCD SLEEVE CALF MEDIUM

## (undated) DEVICE — SUT MONOCRYL 3-0 27" PS-2 UNDYED

## (undated) DEVICE — GLV 8 PROTEXIS (WHITE)

## (undated) DEVICE — DRAPE U LONG 47X70"

## (undated) DEVICE — ZIMMER MIXING BOWL

## (undated) DEVICE — NDL HYPO SAFE 20G X 1.5" (YELLOW)

## (undated) DEVICE — ZIMMER BLUE CURETTE

## (undated) DEVICE — TAPE SILK 3"

## (undated) DEVICE — ORTHOALIGN PLUS UNIT

## (undated) DEVICE — DRSG DERMABOND PRINEO 22CM

## (undated) DEVICE — SOL IRR POUR NS 0.9% 1000ML

## (undated) DEVICE — ZIMMER PULSAVAC PLUS FAN KIT

## (undated) DEVICE — SAW BLADE ZIMMER RECIPROCATING DOUBLE SIDED 12.5X96X1.19MM

## (undated) DEVICE — WARMING BLANKET UPPER ADULT

## (undated) DEVICE — SUT VICRYL 0 18" CT-1 UNDYED (POP-OFF)

## (undated) DEVICE — SUT VICRYL 2-0 27" CT-2 UNDYED

## (undated) DEVICE — ELCTR STRYKER NEPTUNE SMOKE EVACUATION PENCIL (GREEN)